# Patient Record
Sex: MALE | Race: WHITE | URBAN - METROPOLITAN AREA
[De-identification: names, ages, dates, MRNs, and addresses within clinical notes are randomized per-mention and may not be internally consistent; named-entity substitution may affect disease eponyms.]

---

## 2017-03-01 ENCOUNTER — MEDICAL CORRESPONDENCE (OUTPATIENT)
Dept: HEALTH INFORMATION MANAGEMENT | Facility: CLINIC | Age: 22
End: 2017-03-01

## 2017-03-01 ENCOUNTER — OFFICE VISIT (OUTPATIENT)
Dept: ONCOLOGY | Facility: CLINIC | Age: 22
End: 2017-03-01
Attending: GENETIC COUNSELOR, MS
Payer: COMMERCIAL

## 2017-03-01 DIAGNOSIS — Z80.0 FAMILY HISTORY OF PANCREATIC CANCER: ICD-10-CM

## 2017-03-01 DIAGNOSIS — Z84.81 FAMILY HISTORY OF CARRIER OF GENETIC DISEASE: ICD-10-CM

## 2017-03-01 DIAGNOSIS — Z84.81 FAMILY HISTORY OF CARRIER OF GENETIC DISEASE: Primary | ICD-10-CM

## 2017-03-01 DIAGNOSIS — Z80.0 FAMILY HISTORY OF COLON CANCER: ICD-10-CM

## 2017-03-01 DIAGNOSIS — Z80.49 FAMILY HISTORY OF UTERINE CANCER: ICD-10-CM

## 2017-03-01 LAB — MISCELLANEOUS TEST: NORMAL

## 2017-03-01 PROCEDURE — 96040 ZZH GENETIC COUNSELING, EACH 30 MINUTES: CPT | Mod: ZF | Performed by: GENETIC COUNSELOR, MS

## 2017-03-01 NOTE — LETTER
3/1/2017       RE: Henrik Alexander  [address] [facility] [address]     Dear Colleague,    Thank you for referring your patient, Henrik Alexander, to the [facility]. Please see a copy of my visit note below.    3/1/2017    Referring Provider: self-referred    Presenting Information:   I met with Henrik Alexander and his mother today for genetic counseling at the Cancer Risk Management Program at the [facility] to discuss his family history of Ford syndrome.  He is here today to review this history, cancer screening recommendations, and available genetic testing options.    Personal History:  Henrik is a 21 year old male.  He does not have a personal history of cancer, and is reportedly healthy. He has not had a colonoscopy.     Family History: (Please see scanned pedigree for detailed family history information)    Henrik's mother was diagnosed with Ford syndrome, folloiwng her endometrial cancer diagnosis in 2004. She previously had colon cancer at age 25.  She had genetic testing at [facility], which she reports was sent to a laboratory in [place] (she says [facility] does sound like the correct lab).  She does not recall which gene she tested positive for, but when reviewing the genes, she thinks that either MLH1 or MSH2 (one of the Ford syndrome genes associated with a higher risk for cancer) sounded correct; she did not recall ever hearing about EPCAM.     Henrik's siblings have all been tested for Ford syndrome:    His brother, age 26, is positive, and has had previous colonoscopies, which have not shown concerning findings per report. He had an upper endoscopy today, and a biopsy was taken.      Sister, age 32, is negative.     Sister, age 21, is negative.    Sister, age 21, (twin to above sister) is positive.  According to Henrik's mother, she has had three colonoscopies total, and has had one pre-cancerous polyp removed.  She had her most recent colonoscopy in  2017 that was normal.    Henrik's maternal aunt passed away from pancreatic cancer at 59. She was tested for Ford syndrome before she passed, and was negative.  She was a past smoker, but did not drink and did not have diabetes.     Maternal aunt has Ford syndrome, and was diagnosed with colon cancer at 51 with bladder metastasis. She is currently 53.  She has had her uterus and ovaries removed.  She has three children that have not yet been tested for Ford syndrome.    Henrik has two maternal uncles, 49 and 46, who have not had genetic testing.  They have not had cancer, nor have their children, who have also not been tested. Henrik's mother reports that she has been trying to get them to come in for testing.    Maternal grandmother  at 76 from kidney failure.    Maternal grandfather  at 72 from pancreatic cancer, and also had a histoyr of colon cancer his his 40s.  He had four sisters, one of whom Henrik's mother thinks had lymphoma.  She does not believe his other three sisters or his parents had cancer.    No paternal family history of cancer, aside from lung cancer in his paternal grandmother, who was a smoker.    His maternal ethnicity is [place]. His paternal ethnicity is Turkish.  There is no known Ashkenazi Pentecostal ancestry on either side of his family. There is no reported consanguinity.    Discussion:    We discussed the natural history and genetics of Ford syndrome today.  Ford syndrome can be caused by a mutation in one of five genes:  MLH1, MSH2, MSH6, PMS2, and EPCAM. We focused on the high-risk Ford syndrome genes.  We discussed that the highest cancer risks associated with Ford syndrome include colon cancer, endometrial/uterine cancer, gastric cancer, and ovarian cancer.    A detailed handout regarding Ford syndrome and the information we discussed was provided to Henrik at the end of our appointment today and can be found in the after visit summary.  Topics included:  inheritance pattern, cancer risks, cancer screening recommendations, and also risks, benefits and limitations of testing.    Based on his personal and family history, Henrik meets current National Comprehensive Cancer Network (NCCN) criteria for genetic testing for Ford syndrome, as there is a known diagnosis in the family.      Genetic testing is available for single site analysis of the Ford syndrome mutation found in Henrik's family.  We discussed there is a 50% chance that he also has this mutation.    We also discussed that in rare situations in which both parents have a mutation in a Ford syndrome gene, their children each have a 25% risk for constitutional mismatch repair deficiency syndrome (CMMRD).  CMMRD is a disorder that causes cafe-au-lait spots and childhood cancers.  For individuals of childbearing age with Ford syndrome, genetic counseling and genetic testing may be advised for their partners.    The information from genetic testing may determine additional cancer screening recommendations for Henrik.  These recommendations will be discussed in detail once genetic testing is completed.    Plan:  1) Today Henrik elected to proceed with single site testing for the Ford syndrome mutation in his family.  His mother will send me his test report later.  2) This information should be available in 3-4 weeks, once testing is initiated.  3) Henrik will return to clinic to discuss the results    Face to face time: 30 minutes    Destinee Jensen MS, [facility]  Certified Genetic Counselor  P. [phone]  F. 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  Henirk's mom will still get me a physical copy of her test report.    Again, thank you for allowing me to participate in the care of your patient.      Sincerely,    Destinee Jensen GC

## 2017-03-01 NOTE — PROGRESS NOTES
3/1/2017    Referring Provider: self-referred    Presenting Information:   I met with Henrik Alexander and his mother today for genetic counseling at the Cancer Risk Management Program at the [facility] to discuss his family history of Ford syndrome.  He is here today to review this history, cancer screening recommendations, and available genetic testing options.    Personal History:  Henrik is a 21 year old male.  He does not have a personal history of cancer, and is reportedly healthy. He has not had a colonoscopy.     Family History: (Please see scanned pedigree for detailed family history information)    Henrik's mother was diagnosed with Ford syndrome, folloiwng her endometrial cancer diagnosis in 2004. She previously had colon cancer at age 25.  She had genetic testing at [facility], which she reports was sent to a laboratory in [place] (she says [facility] does sound like the correct lab).  She does not recall which gene she tested positive for, but when reviewing the genes, she thinks that either MLH1 or MSH2 (one of the Ford syndrome genes associated with a higher risk for cancer) sounded correct; she did not recall ever hearing about EPCAM.     Henrik's siblings have all been tested for Ford syndrome:    His brother, age 26, is positive, and has had previous colonoscopies, which have not shown concerning findings per report. He had an upper endoscopy today, and a biopsy was taken.      Sister, age 32, is negative.     Sister, age 21, is negative.    Sister, age 21, (twin to above sister) is positive.  According to Henrik's mother, she has had three colonoscopies total, and has had one pre-cancerous polyp removed.  She had her most recent colonoscopy in January 2017 that was normal.    Henrik's maternal aunt passed away from pancreatic cancer at 59. She was tested for Ford syndrome before she passed, and was negative.  She was a past smoker, but did not drink and did not have diabetes.      Maternal aunt has Ford syndrome, and was diagnosed with colon cancer at 51 with bladder metastasis. She is currently 53.  She has had her uterus and ovaries removed.  She has three children that have not yet been tested for Ford syndrome.    Henrik has two maternal uncles, 49 and 46, who have not had genetic testing.  They have not had cancer, nor have their children, who have also not been tested. Henrik's mother reports that she has been trying to get them to come in for testing.    Maternal grandmother  at 76 from kidney failure.    Maternal grandfather  at 72 from pancreatic cancer, and also had a histoyr of colon cancer his his 40s.  He had four sisters, one of whom Henrik's mother thinks had lymphoma.  She does not believe his other three sisters or his parents had cancer.    No paternal family history of cancer, aside from lung cancer in his paternal grandmother, who was a smoker.    His maternal ethnicity is [place]. His paternal ethnicity is [place].  There is no known Ashkenazi Rest[employer] ancestry on either side of his family. There is no reported consanguinity.    Discussion:    We discussed the natural history and genetics of Ford syndrome today.  Ford syndrome can be caused by a mutation in one of five genes:  MLH1, MSH2, MSH6, PMS2, and EPCAM. We focused on the high-risk Ford syndrome genes.  We discussed that the highest cancer risks associated with Ford syndrome include colon cancer, endometrial/uterine cancer, gastric cancer, and ovarian cancer.    A detailed handout regarding Ford syndrome and the information we discussed was provided to Henrik at the end of our appointment today and can be found in the after visit summary.  Topics included: inheritance pattern, cancer risks, cancer screening recommendations, and also risks, benefits and limitations of testing.    Based on his personal and family history, Henrik meets current National Comprehensive Cancer Network (NCCN) criteria for  genetic testing for Ford syndrome, as there is a known diagnosis in the family.      Genetic testing is available for single site analysis of the Ford syndrome mutation found in Henrik's family.  We discussed there is a 50% chance that he also has this mutation.    We also discussed that in rare situations in which both parents have a mutation in a Ford syndrome gene, their children each have a 25% risk for constitutional mismatch repair deficiency syndrome (CMMRD).  CMMRD is a disorder that causes cafe-au-lait spots and childhood cancers.  For individuals of childbearing age with Ford syndrome, genetic counseling and genetic testing may be advised for their partners.    The information from genetic testing may determine additional cancer screening recommendations for Henrik.  These recommendations will be discussed in detail once genetic testing is completed.    Plan:  1) Today Henrik elected to proceed with single site testing for the Ford syndrome mutation in his family.  His mother will send me his test report later.  2) This information should be available in 3-4 weeks, once testing is initiated.  3) Henrik will return to clinic to discuss the results    Face to face time: 30 minutes    Destinee Jensen MS, [facility]  Certified Genetic Counselor  P. [phone]  F. 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  Henrik's mom will still get me a physical copy of her test report.

## 2017-03-01 NOTE — PATIENT INSTRUCTIONS
Assessing Cancer Risk  Only about 5-10% of cancers are thought to be due to an inherited cancer susceptibility gene.    These families often have:    Several people with the same or related types of cancer    Cancers diagnosed at a young age (before age 50)    Individuals with more than one primary cancer    Multiple generations of the family affected with cancer    Ford Syndrome Genes  We each inherit two copies of every gene in our bodies: one from our mother and one from our father.  Each gene has a specific job to do.  When a gene has a mistake or  mutation  in it, it does not work like it should. Currently five genes are known to cause Ford Syndrome: MLH1, MSH2, MSH6, PMS2, and EPCAM.  A single mutation in one of the Ford Syndrome genes increases the risk for colon, endometrial, ovarian, and stomach cancers.  Other cancers that occur less commonly in Ford Syndrome include urinary tract, skin, and brain cancers.  The table below lists the chance that a person with Ford syndrome would develop cancer in his or her lifetime1.      Lifetime Cancer Risks    General Population Ford Syndrome   Colon 5.5% 10-80%   Endometrial 2.7% 15-60%   Stomach <1% 1-13%   Ovarian 1.6% 4-24%     Inheriting a mutation does not mean a person will develop cancer, but it does significantly increase his or her risk above the general population risk.    Inheritance   Ford Syndrome mutations are inherited in an autosomal dominant pattern.  This means that if a parent has a mutation, each of his or her children will have a 50% chance of inheriting that same mutation.  Therefore, each child--male or female--would have a 50% chance of being at increased risk for developing cancer.          Image obtained from Genetics Home Reference, 2013     Tumor Screening for Ford Syndrome  There are two different tests that can be done on a person s colon or endometrial tumor as an initial screen for Ford Syndrome. These tests are called IHC  (immunohistochemistry) and MSI (microsatellite instability). Tumors that show an absent protein on IHC or an unstable MSI result could be due to a mutation in one of the known Ford Syndrome genes. The IHC results can also guide further genetic testing for Ford Syndrome by indicating which gene should be tested.     Genetic Testing  Genetic testing involves a blood test and will look at the genetic information in the Ford Syndrome genes for any harmful mutations that are associated with increased cancer risk.  If possible, it is recommended that the person(s) who has had cancer be tested first before other family members.  That person will give us the most useful information about whether or not a specific gene is associated with the cancer in the family.    Results  There are three possible results of Ford Syndrome genetic testing:    Positive--a harmful mutation was identified     Negative--no mutation was identified     Variant of unknown significance--a variation in one of the genes was identified, but it is unclear how this impacts cancer risk in the family    Advantages and Disadvantages  There are advantages and disadvantages to genetic testing of these genes.    Advantages    May clarify your cancer risk    Can help you make medical decisions    May explain the cancers in your family    May give useful information to your family members (if you share your results)    Disadvantages    Possible negative emotional impact of learning about inherited cancer risk    Uncertainty in interpreting a negative test result in some situations    Possible genetic discrimination concerns (see below)    Genetic Information Nondiscrimination Act (MARCELLE)  MARCELLE is a federal law that protects individuals from health insurance or employment discrimination based on a genetic test result alone.  Although rare, there are currently no legal protections in terms of life insurance, long term care, or disability insurances.  Visit the  National Human Genome Research [place] genome.gov/56704268 to learn more.    Reducing Cancer Risk  Current screening recommendations for people with a Ford Syndrome mutation include1:    Colorectal: Colonoscopy beginning at age 20-25 or 2-5 years before earliest diagnosis of colorectal cancer in the family; repeated every 1-2 years depending on family history    Endometrial/Ovarian:   o Consider surgery to remove uterus, ovaries, and fallopian tubes after child bearing  o Talk to your doctor about possible endometrial biopsy, transvaginal ultrasound, and CA-125 blood test screening for endometrial and ovarian cancer. There are limitations to this type of screening.    Stomach: Consider upper endoscopy (EGD with extended duodenoscopy) beginning at age 30-35; repeated every 3-5 years    Urinary Tract: Consider annual urinalysis starting at 25-30    Brain: Annual physical examination beginning at age 25-30     Depending upon the mutation in the family, dermatology evaluation or prostate screening may be recommended.  Early detection and prevention are primary goals of screening for colorectal cancer.  These recommendations may change based on the specific gene mutation in the family.     Questions to Think About Regarding Genetic Testing    What effect will the test result have on me and my relationship with my family members if I have an inherited gene mutation?  If I don t have a gene mutation?    Should I share my test results, and how will my family react to this news, which may also affect them?    Are my children ready to learn new information that may one day affect their own health?    Resources  Ford Syndrome International [email or web address]-klasa.[email or web address]   Ford Syndrome Screening Network lynchscreening.net   American Cancer Society (ACS) cancer.org   National Cancer [place] (NCI) cancer.gov     Please call us if you have any questions or concerns.    Cancer Risk Management Program [phone]  ([phone])  ? Lynne Cody, MS, [facility]  [phone]  ? Destinee Jensen, MS, [facility]  [phone]  ? Betzy Gleason, MS, [facility]  [phone]  ? Mei Torres, MS, [facility]  [phone]    References  1. National Comprehensive Cancer Network. Clinical practice guidelines in oncology, colorectal cancer screening. Available online (registration required). 2013.

## 2017-03-01 NOTE — MR AVS SNAPSHOT
After Visit Summary   3/1/2017    Henrik Alexander    MRN: [id number]           Patient Information     Date Of Birth          1995        Visit Information        Provider Department      3/1/2017 2:15 PM Destinee Jensen GC;  2 [address] CONSULT [facility]        Today's Diagnoses     Family history of carrier of genetic disease    -  1    Family history of colon cancer        Family history of uterine cancer        Family history of pancreatic cancer          Care Instructions        Assessing Cancer Risk  Only about 5-10% of cancers are thought to be due to an inherited cancer susceptibility gene.    These families often have:    Several people with the same or related types of cancer    Cancers diagnosed at a young age (before age 50)    Individuals with more than one primary cancer    Multiple generations of the family affected with cancer    Ford Syndrome Genes  We each inherit two copies of every gene in our bodies: one from our mother and one from our father.  Each gene has a specific job to do.  When a gene has a mistake or  mutation  in it, it does not work like it should. Currently five genes are known to cause Ford Syndrome: MLH1, MSH2, MSH6, PMS2, and EPCAM.  A single mutation in one of the Ford Syndrome genes increases the risk for colon, endometrial, ovarian, and stomach cancers.  Other cancers that occur less commonly in Ford Syndrome include urinary tract, skin, and brain cancers.  The table below lists the chance that a person with Ford syndrome would develop cancer in his or her lifetime1.      Lifetime Cancer Risks    General Population Ford Syndrome   Colon 5.5% 10-80%   Endometrial 2.7% 15-60%   Stomach <1% 1-13%   Ovarian 1.6% 4-24%     Inheriting a mutation does not mean a person will develop cancer, but it does significantly increase his or her risk above the general population risk.    Inheritance   Ford Syndrome mutations are inherited in an autosomal  dominant pattern.  This means that if a parent has a mutation, each of his or her children will have a 50% chance of inheriting that same mutation.  Therefore, each child--male or female--would have a 50% chance of being at increased risk for developing cancer.          Image obtained from Genetics Home Reference, 2013     Tumor Screening for Ford Syndrome  There are two different tests that can be done on a person s colon or endometrial tumor as an initial screen for Ford Syndrome. These tests are called IHC (immunohistochemistry) and MSI (microsatellite instability). Tumors that show an absent protein on IHC or an unstable MSI result could be due to a mutation in one of the known Ford Syndrome genes. The IHC results can also guide further genetic testing for Ford Syndrome by indicating which gene should be tested.     Genetic Testing  Genetic testing involves a blood test and will look at the genetic information in the Ford Syndrome genes for any harmful mutations that are associated with increased cancer risk.  If possible, it is recommended that the person(s) who has had cancer be tested first before other family members.  That person will give us the most useful information about whether or not a specific gene is associated with the cancer in the family.    Results  There are three possible results of Ford Syndrome genetic testing:    Positive--a harmful mutation was identified     Negative--no mutation was identified     Variant of unknown significance--a variation in one of the genes was identified, but it is unclear how this impacts cancer risk in the family    Advantages and Disadvantages  There are advantages and disadvantages to genetic testing of these genes.    Advantages    May clarify your cancer risk    Can help you make medical decisions    May explain the cancers in your family    May give useful information to your family members (if you share your results)    Disadvantages    Possible  negative emotional impact of learning about inherited cancer risk    Uncertainty in interpreting a negative test result in some situations    Possible genetic discrimination concerns (see below)    Genetic Information Nondiscrimination Act (MARCELLE)  MARCELLE is a federal law that protects individuals from health insurance or employment discrimination based on a genetic test result alone.  Although rare, there are currently no legal protections in terms of life insurance, long term care, or disability insurances.  Visit the National Human Genome Research [place] genome.gov/83421747 to learn more.    Reducing Cancer Risk  Current screening recommendations for people with a Ford Syndrome mutation include1:    Colorectal: Colonoscopy beginning at age 20-25 or 2-5 years before earliest diagnosis of colorectal cancer in the family; repeated every 1-2 years depending on family history    Endometrial/Ovarian:   o Consider surgery to remove uterus, ovaries, and fallopian tubes after child bearing  o Talk to your doctor about possible endometrial biopsy, transvaginal ultrasound, and CA-125 blood test screening for endometrial and ovarian cancer. There are limitations to this type of screening.    Stomach: Consider upper endoscopy (EGD with extended duodenoscopy) beginning at age 30-35; repeated every 3-5 years    Urinary Tract: Consider annual urinalysis starting at 25-30    Brain: Annual physical examination beginning at age 25-30     Depending upon the mutation in the family, dermatology evaluation or prostate screening may be recommended.  Early detection and prevention are primary goals of screening for colorectal cancer.  These recommendations may change based on the specific gene mutation in the family.     Questions to Think About Regarding Genetic Testing    What effect will the test result have on me and my relationship with my family members if I have an inherited gene mutation?  If I don t have a gene  mutation?    Should I share my test results, and how will my family react to this news, which may also affect them?    Are my children ready to learn new information that may one day affect their own health?    Resources  Ford Syndrome International lynchcancers.E-Diversify Yourself   Ford Syndrome Screening Network lynchscreening.net   American Cancer Society (ACS) cancer.org   National Cancer [place] (NCI) cancer.gov     Please call us if you have any questions or concerns.    Cancer Risk Management Program [phone] ([phone])  ? Lynne Glo, MS, [facility]  [phone]  ? Destinee Camila, MS, [facility]  [phone]  ? Betzy Gleason, MS, [facility]  [phone]  ? Mei Torres, MS, [facility]  [phone]    References  1. National Comprehensive Cancer Network. Clinical practice guidelines in oncology, colorectal cancer screening. Available online (registration required). 2013.                Follow-ups after your visit        Your next 10 appointments already scheduled     Apr 05, 2017  1:15 PM CDT   (Arrive by 1:00 PM)   RETURN WITH ROOM with Destinee Jensen GC,  2 114 CONSULT [facility] ([facility])    [address] [place] [id number] [phone]              Who to contact     If you have questions or need follow up information about today's clinic visit or your schedule please contact [facility] directly at [phone].  Normal or non-critical lab and imaging results will be communicated to you by MyChart, letter or phone within 4 business days after the clinic has received the results. If you do not hear from us within 7 days, please contact the clinic through MyChart or phone. If you have a critical or abnormal lab result, we will notify you by phone as soon as possible.  Submit refill requests through [employer]book or call your pharmacy and they will forward the refill request to us. Please allow 3 business days for your refill to be  "completed.          Additional Information About Your Visit        [employer]harSport[employer]zer Information     PlayerLync lets you send messages to your doctor, view your test results, renew your prescriptions, schedule appointments and more. To sign up, go to [email or web address] . Click on \"Log in\" on the left side of the screen, which will take you to the Welcome page. Then click on \"Sign up Now\" on the right side of the page.     You will be asked to enter the access code listed below, as well as some personal information. Please follow the directions to create your username and password.     Your access code is: [email or web address]  Expires: 2017  2:19 PM     Your access code will  in 90 days. If you need help or a new code, please call your [place] clinic or [phone].        Care EveryWhere ID     This is your Care EveryWhere ID. This could be used by other organizations to access your [place] medical records  [id number]         Blood Pressure from Last 3 Encounters:   No data found for BP    Weight from Last 3 Encounters:   No data found for Wt               Primary Care Provider    None Specified       No primary provider on file.        Thank you!     Thank you for choosing [facility]  for your care. Our goal is always to provide you with excellent care. Hearing back from our patients is one way we can continue to improve our services. Please take a few minutes to complete the written survey that you may receive in the mail after your visit with us. Thank you!             Your Updated Medication List - Protect others around you: Learn how to safely use, store and throw away your medicines at www.disposemymeds.org.      Notice  As of 3/1/2017 11:59 PM    You have not been prescribed any medications.      "

## 2017-03-01 NOTE — LETTER
Cancer Risk Management  Program Locations    [facility]  [facility]  Mailing Address  Cancer Risk Management Program  [facility]  [address]  [place] [address]    New patient appointments  [phone]  March 2, 2017    Henrik Alexander  [address] [place] [facility] [address]    Dear Henrik,    It was a pleasure meeting with you and your mother at the [facility] on 3/1/17.  Here is a copy of the progress note from your recent genetic counseling visit to the Cancer Risk Management Program.  If you have any additional questions, please feel free to call.    Referring Provider: self-referred    Presenting Information:   I met with Henrik Alexander and his mother today for genetic counseling at the Cancer Risk Management Program at the [facility] to discuss his family history of Ford syndrome.  He is here today to review this history, cancer screening recommendations, and available genetic testing options.    Personal History:  Henrik is a 21 year old male.  He does not have a personal history of cancer, and is reportedly healthy. He has not had a colonoscopy.     Family History: (Please see scanned pedigree for detailed family history information)    Henrik's mother was diagnosed with Ford syndrome, folloiwng her endometrial cancer diagnosis in 2004. She previously had colon cancer at age 25.  She had genetic testing at [facility], which she reports was sent to a laboratory in [place] (she says [facility] does sound like the correct lab).  She does not recall which gene she tested positive for, but when reviewing the genes, she thinks that either MLH1 or MSH2 (one of the Ford syndrome genes associated with a higher risk for cancer) sounded correct; she did not recall ever hearing about  EPCAM.     Henrik's siblings have all been tested for Ford syndrome:    His brother, age 26, is positive, and has had previous colonoscopies, which have not shown concerning findings per report. He had an upper endoscopy today, and a biopsy was taken.      Sister, age 32, is negative.     Sister, age 21, is negative.    Sister, age 21, (twin to above sister) is positive.  According to Henrik's mother, she has had three colonoscopies total, and has had one pre-cancerous polyp removed.  She had her most recent colonoscopy in 2017 that was normal.    Henrik's maternal aunt passed away from pancreatic cancer at 59. She was tested for Ford syndrome before she passed, and was negative.  She was a past smoker, but did not drink and did not have diabetes.     Maternal aunt has Ford syndrome, and was diagnosed with colon cancer at 51 with bladder metastasis. She is currently 53.  She has had her uterus and ovaries removed.  She has three children that have not yet been tested for Ford syndrome.    Henrik has two maternal uncles, 49 and 46, who have not had genetic testing.  They have not had cancer, nor have their children, who have also not been tested. Henrik's mother reports that she has been trying to get them to come in for testing.    Maternal grandmother  at 76 from kidney failure.    Maternal grandfather  at 72 from pancreatic cancer, and also had a histoyr of colon cancer his his 40s.  He had four sisters, one of whom Henrik's mother thinks had lymphoma.  She does not believe his other three sisters or his parents had cancer.    No paternal family history of cancer, aside from lung cancer in his paternal grandmother, who was a smoker.    His maternal ethnicity is [place]. His paternal ethnicity is [place].  There is no known Ashkenazi Mormon ancestry on either side of his family. There is no reported consanguinity.    Discussion:    We discussed the natural history and genetics of Ford syndrome  today.  Ford syndrome can be caused by a mutation in one of five genes:  MLH1, MSH2, MSH6, PMS2, and EPCAM. We focused on the high-risk Ford syndrome genes.  We discussed that the highest cancer risks associated with Ford syndrome include colon cancer, endometrial/uterine cancer, gastric cancer, and ovarian cancer.    A detailed handout regarding Ford syndrome and the information we discussed was provided to Henrik at the end of our appointment today and can be found in the after visit summary.  Topics included: inheritance pattern, cancer risks, cancer screening recommendations, and also risks, benefits and limitations of testing.    Based on his personal and family history, Henrik meets current National Comprehensive Cancer Network (NCCN) criteria for genetic testing for Ford syndrome, as there is a known diagnosis in the family.      Genetic testing is available for single site analysis of the Ford syndrome mutation found in Henrik's family.  We discussed there is a 50% chance that he also has this mutation.    We also discussed that in rare situations in which both parents have a mutation in a Ford syndrome gene, their children each have a 25% risk for constitutional mismatch repair deficiency syndrome (CMMRD).  CMMRD is a disorder that causes cafe-au-lait spots and childhood cancers.  For individuals of childbearing age with Ford syndrome, genetic counseling and genetic testing may be advised for their partners.    The information from genetic testing may determine additional cancer screening recommendations for Henrik.  These recommendations will be discussed in detail once genetic testing is completed.    Plan:  1) Today Henrik elected to proceed with single site testing for the Ford syndrome mutation in his family.  His mother will send me his test report later.  2) This information should be available in 3-4 weeks, once testing is initiated.  3) Henrik will return to clinic to discuss the results    Face  to face time: 30 minutes    Destinee Jensen MS, [facility]  Certified Genetic Counselor  P. [phone]  F. [phone]

## 2017-03-01 NOTE — LETTER
Date:March 3, 2017      Patient was self referred, no letter generated. Do not send.        [facility]

## 2017-03-01 NOTE — NURSING NOTE
Chief Complaint   Patient presents with     Blood Draw     Pt here for labs, labs collected via venipuncture.

## 2017-04-05 ENCOUNTER — OFFICE VISIT (OUTPATIENT)
Dept: ONCOLOGY | Facility: CLINIC | Age: 22
End: 2017-04-05
Attending: GENETIC COUNSELOR, MS
Payer: COMMERCIAL

## 2017-04-05 DIAGNOSIS — Z15.09 MSH2-RELATED LYNCH SYNDROME (HNPCC1): ICD-10-CM

## 2017-04-05 PROCEDURE — 40000114 ZZH STATISTIC NO CHARGE CLINIC VISIT

## 2017-04-05 PROCEDURE — 40000072 ZZH STATISTIC GENETIC COUNSELING, < 16 MIN: Mod: ZF | Performed by: GENETIC COUNSELOR, MS

## 2017-04-05 NOTE — PATIENT INSTRUCTIONS
Assessing Cancer Risk  Only about 5-10% of cancers are thought to be due to an inherited cancer susceptibility gene.    These families often have:    Several people with the same or related types of cancer    Cancers diagnosed at a young age (before age 50)    Individuals with more than one primary cancer    Multiple generations of the family affected with cancer    Ford Syndrome Genes  We each inherit two copies of every gene in our bodies: one from our mother and one from our father.  Each gene has a specific job to do.  When a gene has a mistake or  mutation  in it, it does not work like it should. Currently five genes are known to cause Ford Syndrome: MLH1, MSH2, MSH6, PMS2, and EPCAM.  A single mutation in one of the Ford Syndrome genes increases the risk for colon, endometrial, ovarian, and stomach cancers.  Other cancers that occur less commonly in Ford Syndrome include urinary tract, skin, and brain cancers.  The table below lists the chance that a person with Ford syndrome would develop cancer in his or her lifetime1.      Lifetime Cancer Risks    General Population Ford Syndrome   Colon 5.5% 10-80%   Endometrial 2.7% 15-60%   Stomach <1% 1-13%   Ovarian 1.6% 4-24%     Inheriting a mutation does not mean a person will develop cancer, but it does significantly increase his or her risk above the general population risk.    Inheritance   Ford Syndrome mutations are inherited in an autosomal dominant pattern.  This means that if a parent has a mutation, each of his or her children will have a 50% chance of inheriting that same mutation.  Therefore, each child--male or female--would have a 50% chance of being at increased risk for developing cancer.          Image obtained from Genetics Home Reference, 2013     Tumor Screening for Ford Syndrome  There are two different tests that can be done on a person s colon or endometrial tumor as an initial screen for Ford Syndrome. These tests are called IHC  (immunohistochemistry) and MSI (microsatellite instability). Tumors that show an absent protein on IHC or an unstable MSI result could be due to a mutation in one of the known Ford Syndrome genes. The IHC results can also guide further genetic testing for Ford Syndrome by indicating which gene should be tested.     Genetic Testing  Genetic testing involves a blood test and will look at the genetic information in the Ford Syndrome genes for any harmful mutations that are associated with increased cancer risk.  If possible, it is recommended that the person(s) who has had cancer be tested first before other family members.  That person will give us the most useful information about whether or not a specific gene is associated with the cancer in the family.    Results  There are three possible results of Ford Syndrome genetic testing:    Positive--a harmful mutation was identified     Negative--no mutation was identified     Variant of unknown significance--a variation in one of the genes was identified, but it is unclear how this impacts cancer risk in the family    Advantages and Disadvantages  There are advantages and disadvantages to genetic testing of these genes.    Advantages    May clarify your cancer risk    Can help you make medical decisions    May explain the cancers in your family    May give useful information to your family members (if you share your results)    Disadvantages    Possible negative emotional impact of learning about inherited cancer risk    Uncertainty in interpreting a negative test result in some situations    Possible genetic discrimination concerns (see below)    Genetic Information Nondiscrimination Act (MARCELLE)  MARCELLE is a federal law that protects individuals from health insurance or employment discrimination based on a genetic test result alone.  Although rare, there are currently no legal protections in terms of life insurance, long term care, or disability insurances.  Visit the  National Human Genome Research [place] genome.gov/40990396 to learn more.    Reducing Cancer Risk  Current screening recommendations for people with a Ford Syndrome mutation include1:    Colorectal: Colonoscopy beginning at age 20-25 or 2-5 years before earliest diagnosis of colorectal cancer in the family; repeated every 1-2 years depending on family history    Endometrial/Ovarian:   o Consider surgery to remove uterus, ovaries, and fallopian tubes after child bearing  o Talk to your doctor about possible endometrial biopsy, transvaginal ultrasound, and CA-125 blood test screening for endometrial and ovarian cancer. There are limitations to this type of screening.    Stomach: Consider upper endoscopy (EGD with extended duodenoscopy) beginning at age 30-35; repeated every 3-5 years    Urinary Tract: Consider annual urinalysis starting at 25-30    Brain: Annual physical examination beginning at age 25-30     Depending upon the mutation in the family, dermatology evaluation or prostate screening may be recommended.  Early detection and prevention are primary goals of screening for colorectal cancer.  These recommendations may change based on the specific gene mutation in the family.     Questions to Think About Regarding Genetic Testing    What effect will the test result have on me and my relationship with my family members if I have an inherited gene mutation?  If I don t have a gene mutation?    Should I share my test results, and how will my family react to this news, which may also affect them?    Are my children ready to learn new information that may one day affect their own health?    Resources  Ford Syndrome International lynchcancers.Roombeats   Ford Syndrome Screening Network lynchscreening.net   American Cancer Society (ACS) cancer.org   National Cancer [place] (NCI) cancer.gov     Please call us if you have any questions or concerns.    Cancer Risk Management Program [phone]  ([phone])  ? Lynne Cody, MS, [facility]  [phone]  ? Destinee Jensen, MS, [facility]  [phone]  ? Betzy Gleason, MS, [facility]  [phone]  ? Mei Torres, MS, [facility]  [phone]    References  1. National Comprehensive Cancer Network. Clinical practice guidelines in oncology, colorectal cancer screening. Available online (registration required). 2013.        Resources for Ford Syndrome     Ford Syndrome International -  http://lynchAkamedia.Compete/   Ford Syndrome International (LSI) provides support for individuals with Ford syndrome. This organization was founded by patients and health care providers who specialize in Ford syndrome. LSI strives to raise awareness, as well as educate others about Ford syndrome.     Hereditary Colon Cancer Takes Guts - http://www.[facility] Cent[facility]akesguts.org/peer-support  This is a non-profit organization that supports and connects individuals with hereditary colon cancer syndromes.  They also promote research and health care initiatives.     I Have Ford Syndrome - http://www.ihavelynchsyndrome.org/   The goal of this non-profit organization is to educate others and raise awareness about Ford syndrome in the medical community, as well as the public.      Ford Syndrome Screening Network - http://www.lynchscreening.net/   This organization was founded in 2011, with the goal to promote universal tumor screening for Ford syndrome for those with a diagnosis of colorectal and endometrial cancers.     [employer] - www.Studentgemssclubtwincities.org   [employer] is a 501(c)3 nonprofit and the local affiliate of the Cancer Support Community, a network of more than 54 supportive, free and welcoming  clubhouses  where everyone living with cancer can come for social, emotional and psychological support. Clubs are healing environments where individuals learn from each other with guidance from licensed professionals.    MOCA: [place] [facility] - http://mnovarian.org/   MOCA  was started in 1999 by a small group of ovarian cancer survivors who came together to fund ovarian cancer research, raise awareness of the disease, and provide support to women with ovarian cancer and their families.    Other References:    Genetics Home Reference - http://ghr.nlm.nih.gov/condition/mckeon-syndrome    American Cancer Society - www.cancer.org    How Do I Tell My Children? This article is about the BRCA gene for hereditary breast cancer, but the theme of the article applies to Mckeon syndrome, too.  http://www.facingourrisk.org/understanding-brca-and-hboc/publications/newsletter/archives/2008winter/how-tell-children.php     National Society of Genetic Counselors: http://www.nsgc.org/

## 2017-04-05 NOTE — LETTER
"    Cancer Risk Management  Program Locations    [facility]  [facility]  Mailing Address  Cancer Risk Management Program  [facility]  [address]  [place] [address]    New patient appointments  [phone]  April 5, 2017    Henrik Alexander  [address] [facility] [address]      Dear Henrik,    It was a pleasure meeting with you again.  Here is a copy of the progress note from your recent genetic counseling visit to the Cancer Risk Management Program on 4/5/17.  If you have any additional questions, please feel free to call.      Referring Provider: self-referred    Presenting Information:   Henrik returned to the Cancer Risk Management Program at the [facility] with his mother today to discuss his genetic testing results. His blood was drawn on 3/1/2017 and we ordered MSH2 site specific analysis testing from [employer]. This testing was done because of his family history of Ford syndrome.     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 This test only looked for one mutation.  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 We reviewed the impact of this testing on Henrik today, though he and his mother are already very familiar with Ford syndrome, given the family history.     A copy of the test report can be found in the Laboratory tab, dated 3/1/2017, and named \"SEND OUTS MISC TEST\". The report is scanned in as a linked document.          Cancer Risks:  Individuals with MSH2 mutations:    Have a 52-82% lifetime risk of developing colon cancer.     Lifetime " endometrial (uterine) cancer risk of 25-60%.    Lifetime ovarian cancer risk of approximately 4-12%.    Additional risks are seen for stomach (6-13%), small bowel (3-6%), urinary tract (1-7%), pancreatic (1-6%), brain (1-3%), and hepatobiliary tract cancers (1.4-4%).     Some families also have increased risk for sebaceous neoplasms (1-9%).      Cancer Screening and Prevention:  The following screening (NCCN guidelines) is recommended for individuals who have Ford syndrome due to a mutation in the MSH2 gene:    Colonoscopies starting at age 20-25 (or earlier based on family history), repeated every 1-2 years.    Possible consideration of upper endoscopy (EGD) with extended duodenoscopy starting at age 30-35, repeated every 3-5 years. This recommendation is largely dependent on family history.    Consider annual urinalysis starting at age 30-35.     Annual physical/neurological exams starting at 25-30.      When finished planning their families, women are encouraged to consider hysterectomy and removal of the ovaries due to the limitations in screening for these types of cancer.  However, transvaginal ultrasound, endometrial biopsy, and CA-125 blood tests could be considered to screen for these cancers.    There are no clear current screening recommendations for other Ford syndrome-related cancers such as breast cancer, pancreatic cancer, hepatobiliary tract cancer, etc. Screening for these and other cancers may be recommended based on family history.    Pancreatic cancer:  Due to Henrik s family history of pancreatic cancer (in two second-degree relatives, though one individual with pancreatic cancer did not have Ford syndrome by genetic testing), screening for pancreatic cancer may be considered (Joelle et al, Gut 2013; 62: 339-347)(Russel S, et al., Am J Gastroenterol 2015; 110:223-262).  The specific type of screening and recommendations on when screening should begin should be discussed with a physician.      Although no specific screening guidelines exist for sebaceous neoplasms, it may be reasonable for Henrik to see a dermatologist. Henrik's mother reports that her brother (who has not been tested for Ford syndrome, but is at 50% risk) does have lesions removed from his face quite often, but she is not sure what these are. She plans to ask him about this.     We discussed that Henrik could participate in our Cancer Risk Management Program in which our clinical nurse specialist provides an individual screening plan and assists with medical management.  He was agreeable to this, and a referral was made to see MARIA A Niño, for this service.  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     Implications for Family Members:  We reviewed that mutations in MSH2 are inherited in an autosomal dominant pattern. This means that each of Henrik's future children have a 50% chance of inheriting the same mutation.  Henrik's siblings have already been tested for Ford syndrome.  I am happy to help his other relatives connect with a genetic counselor in their area if they would like to discuss testing.    In rare situations in which both parents have a mutation in the MSH2 gene, their children each have a 25% risk for constitutional mismatch repair deficiency syndrome (CMMRD).  CMMRD is a disorder that causes cafe-au-lait spots and childhood cancers.  For individuals of childbearing age with MSH2 mutations, genetic counseling and genetic testing may be advised for their partners.    Support Resources:  I provided Henrik with support resources and a handout from our Cancer Risk Management Program on Ford syndrome (see after-visit summary)    Plan:  1.  I provided Henrik with a copy of his test results and support resources today.  2.  He plans to follow up with his physicians.  3. A referral to MARIA A Niño, was placed.  If  Henrik has additional questions, I encouraged him to contact  me  directly at [phone].   Time spent face to face: 15 minutes.  Destinee Jensen MS, [facility]  Certified Genetic Counselor  P. [phone]  F. [phone]

## 2017-04-05 NOTE — LETTER
Date:April 6, 2017      Patient was self referred, no letter generated. Do not send.        [facility] Health Information

## 2017-04-05 NOTE — MR AVS SNAPSHOT
After Visit Summary   4/5/2017    Henrik Alexander    MRN: [id number]           Patient Information     Date Of Birth          1995        Visit Information        Provider Department      4/5/2017 1:15 PM Destinee Jensen GC;  2 114 CONSULT [facility]        Care Instructions        Assessing Cancer Risk  Only about 5-10% of cancers are thought to be due to an inherited cancer susceptibility gene.    These families often have:    Several people with the same or related types of cancer    Cancers diagnosed at a young age (before age 50)    Individuals with more than one primary cancer    Multiple generations of the family affected with cancer    Ford Syndrome Genes  We each inherit two copies of every gene in our bodies: one from our mother and one from our father.  Each gene has a specific job to do.  When a gene has a mistake or  mutation  in it, it does not work like it should. Currently five genes are known to cause Ford Syndrome: MLH1, MSH2, MSH6, PMS2, and EPCAM.  A single mutation in one of the Ford Syndrome genes increases the risk for colon, endometrial, ovarian, and stomach cancers.  Other cancers that occur less commonly in Ford Syndrome include urinary tract, skin, and brain cancers.  The table below lists the chance that a person with Ford syndrome would develop cancer in his or her lifetime1.      Lifetime Cancer Risks    General Population Ford Syndrome   Colon 5.5% 10-80%   Endometrial 2.7% 15-60%   Stomach <1% 1-13%   Ovarian 1.6% 4-24%     Inheriting a mutation does not mean a person will develop cancer, but it does significantly increase his or her risk above the general population risk.    Inheritance   Ford Syndrome mutations are inherited in an autosomal dominant pattern.  This means that if a parent has a mutation, each of his or her children will have a 50% chance of inheriting that same mutation.  Therefore, each child--male or female--would have a  50% chance of being at increased risk for developing cancer.          Image obtained from Genetics Home Reference, 2013     Tumor Screening for Ford Syndrome  There are two different tests that can be done on a person s colon or endometrial tumor as an initial screen for Ford Syndrome. These tests are called IHC (immunohistochemistry) and MSI (microsatellite instability). Tumors that show an absent protein on IHC or an unstable MSI result could be due to a mutation in one of the known Ford Syndrome genes. The IHC results can also guide further genetic testing for Ford Syndrome by indicating which gene should be tested.     Genetic Testing  Genetic testing involves a blood test and will look at the genetic information in the Ford Syndrome genes for any harmful mutations that are associated with increased cancer risk.  If possible, it is recommended that the person(s) who has had cancer be tested first before other family members.  That person will give us the most useful information about whether or not a specific gene is associated with the cancer in the family.    Results  There are three possible results of Ford Syndrome genetic testing:    Positive--a harmful mutation was identified     Negative--no mutation was identified     Variant of unknown significance--a variation in one of the genes was identified, but it is unclear how this impacts cancer risk in the family    Advantages and Disadvantages  There are advantages and disadvantages to genetic testing of these genes.    Advantages    May clarify your cancer risk    Can help you make medical decisions    May explain the cancers in your family    May give useful information to your family members (if you share your results)    Disadvantages    Possible negative emotional impact of learning about inherited cancer risk    Uncertainty in interpreting a negative test result in some situations    Possible genetic discrimination concerns (see below)    Genetic  Information Nondiscrimination Act (MARCELLE)  MARCELLE is a federal law that protects individuals from health insurance or employment discrimination based on a genetic test result alone.  Although rare, there are currently no legal protections in terms of life insurance, long term care, or disability insurances.  Visit the National Human Genome Research [place] genome.gov/76777988 to learn more.    Reducing Cancer Risk  Current screening recommendations for people with a Ford Syndrome mutation include1:    Colorectal: Colonoscopy beginning at age 20-25 or 2-5 years before earliest diagnosis of colorectal cancer in the family; repeated every 1-2 years depending on family history    Endometrial/Ovarian:   o Consider surgery to remove uterus, ovaries, and fallopian tubes after child bearing  o Talk to your doctor about possible endometrial biopsy, transvaginal ultrasound, and CA-125 blood test screening for endometrial and ovarian cancer. There are limitations to this type of screening.    Stomach: Consider upper endoscopy (EGD with extended duodenoscopy) beginning at age 30-35; repeated every 3-5 years    Urinary Tract: Consider annual urinalysis starting at 25-30    Brain: Annual physical examination beginning at age 25-30     Depending upon the mutation in the family, dermatology evaluation or prostate screening may be recommended.  Early detection and prevention are primary goals of screening for colorectal cancer.  These recommendations may change based on the specific gene mutation in the family.     Questions to Think About Regarding Genetic Testing    What effect will the test result have on me and my relationship with my family members if I have an inherited gene mutation?  If I don t have a gene mutation?    Should I share my test results, and how will my family react to this news, which may also affect them?    Are my children ready to learn new information that may one day affect their own  health?    Resources  Ford Syndrome International lynchcancers.com   Ford Syndrome Screening Network lynchscreening.net   American Cancer Society (ACS) cancer.org   National Cancer [place] (NCI) cancer.gov     Please call us if you have any questions or concerns.    Cancer Risk Management Program [phone] ([phone])  ? Lynne Cody, MS, [facility]  [phone]  ? Destinee Camila, MS, [facility]  [phone]  ? Betzy Victorino, MS, [facility]  [phone]  ? Mei Torres, MS, [facility]  [phone]    References  1. National Comprehensive Cancer Network. Clinical practice guidelines in oncology, colorectal cancer screening. Available online (registration required). 2013.        Resources for Ford Syndrome     Ford Syndrome International -  http://Functional Neuromodulation/   Ford Syndrome International (LSI) provides support for individuals with Ford syndrome. This organization was founded by patients and health care providers who specialize in Ford syndrome. LSI strives to raise awareness, as well as educate others about Ford syndrome.     Hereditary Colon Cancer Takes Guts - http://www.hcctakesguts.org/peer-support  This is a non-profit organization that supports and connects individuals with hereditary colon cancer syndromes.  They also promote research and health care initiatives.     I Have Ford Syndrome - http://www.ihavelynchsyndrome.org/   The goal of this non-profit organization is to educate others and raise awareness about Ford syndrome in the medical community, as well as the public.      Ford Syndrome Screening Network - http://www.lynchscreening.net/   This organization was founded in 2011, with the goal to promote universal tumor screening for Ford syndrome for those with a diagnosis of colorectal and endometrial cancers.     [place] [facility] - www.Conductorsclubtwincities.org   "[employer]" [facility] is a 501(c)3 nonprofit and the local affiliate of the Cancer Support Community, a network of more than 54  supportive, free and welcoming  clubhouses  where everyone living with cancer can come for social, emotional and psychological support. Clubs are healing environments where individuals learn from each other with guidance from licensed professionals.    MOCA: [place] [facility] - http://mnovarian.org/   MOCA was started in 1999 by a small group of ovarian cancer survivors who came together to fund ovarian cancer research, raise awareness of the disease, and provide support to women with ovarian cancer and their families.    Other References:    Genetics Home Reference - http://ghr.nlm.nih.gov/condition/mckeon-syndrome    American Cancer Society - www.cancer.org    How Do I Tell My Children? This article is about the BRCA gene for hereditary breast cancer, but the theme of the article applies to Mckeon syndrome, too.  http://www.facingourrisk.org/understanding-brca-and-hboc/publications/newsletter/archives/2008winter/how-tell-children.php     National Society of Genetic Counselors: http://www.nsgc.org/           Follow-ups after your visit        Your next 10 appointments already scheduled     Apr 05, 2017  1:15 PM CDT   (Arrive by 1:00 PM)   RETURN WITH ROOM with Destinee Jensen GC,  [address] CONSULT [facility] ([facility])    [address] [place] [id number] [phone]              Who to contact     If you have questions or need follow up information about today's clinic visit or your schedule please contact [facility] directly at [phone].  Normal or non-critical lab and imaging results will be communicated to you by MyChart, letter or phone within 4 business days after the clinic has received the results. If you do not hear from us within 7 days, please contact the clinic through MyChart or phone. If you have a critical or abnormal lab result, we will notify you by phone as soon as possible.  Submit  "refill requests through [employer] or call your pharmacy and they will forward the refill request to us. Please allow 3 business days for your refill to be completed.          Additional Information About Your Visit        [employer] Information     Akoha lets you send messages to your doctor, view your test results, renew your prescriptions, schedule appointments and more. To sign up, go to [email or web address] . Click on \"Log in\" on the left side of the screen, which will take you to the Welcome page. Then click on \"Sign up Now\" on the right side of the page.     You will be asked to enter the access code listed below, as well as some personal information. Please follow the directions to create your username and password.     Your access code is: [email or web address]  Expires: 2017  3:19 PM     Your access code will  in 90 days. If you need help or a new code, please call your [facility] clinic or [phone].        Care EveryWhere ID     This is your Care EveryWhere ID. This could be used by other organizations to access your [facility] medical records  [id number]         Blood Pressure from Last 3 Encounters:   No data found for BP    Weight from Last 3 Encounters:   No data found for Wt              Today, you had the following     No orders found for display       Primary Care Provider    None Specified       No primary provider on file.        Thank you!     Thank you for choosing [facility]  for your care. Our goal is always to provide you with excellent care. Hearing back from our patients is one way we can continue to improve our services. Please take a few minutes to complete the written survey that you may receive in the mail after your visit with us. Thank you!             Your Updated Medication List - Protect others around you: Learn how to safely use, store and throw away your medicines at www.disposemymeds.org.      Notice  As of 2017  1:13 PM    You have not been prescribed " any medications.

## 2017-04-05 NOTE — LETTER
"4/5/2017       RE: Henrik Alexander  [address] [facility] [address]     Dear Colleague,    Thank you for referring your patient, Henrik Alexander, to the [facility]. Please see a copy of my visit note below.    4/5/2017    Referring Provider: self-referred    Presenting Information:   Henrik returned to the Cancer Risk Management Program at the [facility] with his mother today to discuss his genetic testing results. His blood was drawn on 3/1/2017 and we ordered MSH2 site specific analysis testing from [facility]. This testing was done because of his family history of Ford syndrome.     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 This mutation was historically called R711X (2131C>T). This test only looked for one mutation.  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 We reviewed the impact of this testing on Henrik today, though he and his mother are already very familiar with Ford syndrome, given the family history.     A copy of the test report can be found in the Laboratory tab, dated 3/1/2017, and named \"SEND OUTS MISC TEST\". The report is scanned in as a linked document.    Cancer Risks:  Individuals with MSH2 mutations:    Have a 52-82% lifetime risk of developing colon cancer.     Lifetime endometrial (uterine) cancer risk of 25-60%.    Lifetime ovarian cancer risk of approximately 4-12%.    Additional risks are seen for stomach (6-13%), small bowel (3-6%), urinary tract (1-7%), pancreatic (1-6%), brain (1-3%), and hepatobiliary tract cancers (1.4-4%).     Some families also have increased risk for sebaceous neoplasms (1-9%).      Cancer Screening and Prevention:  The following screening (NCCN guidelines) is recommended for individuals who have Ford syndrome due to a mutation in the " MSH2 gene:    Colonoscopies starting at age 20-25 (or earlier based on family history), repeated every 1-2 years.    Possible consideration of upper endoscopy (EGD) with extended duodenoscopy starting at age 30-35, repeated every 3-5 years. This recommendation is largely dependent on family history.    Consider annual urinalysis starting at age 30-35.     Annual physical/neurological exams starting at 25-30.      When finished planning their families, women are encouraged to consider hysterectomy and removal of the ovaries due to the limitations in screening for these types of cancer.  However, transvaginal ultrasound, endometrial biopsy, and CA-125 blood tests could be considered to screen for these cancers.    There are no clear current screening recommendations for other Ford syndrome-related cancers such as breast cancer, pancreatic cancer, hepatobiliary tract cancer, etc. Screening for these and other cancers may be recommended based on family history.    Pancreatic cancer:  Due to Henrik s family history of pancreatic cancer (in two second-degree relatives, though one individual with pancreatic cancer did not have Ford syndrome by genetic testing), screening for pancreatic cancer may be considered (Joelle et al, Gut 2013; 62: 339-347)(Russel S, et al., Am J Gastroenterol 2015; 110:223-262).  The specific type of screening and recommendations on when screening should begin should be discussed with a physician.     Although no specific screening guidelines exist for sebaceous neoplasms, it may be reasonable for Henrik to see a dermatologist. Henrik's mother reports that her brother (who has not been tested for Ford syndrome, but is at 50% risk) does have lesions removed from his face quite often, but she is not sure what these are. She plans to ask him about this.     We discussed that Henrik could participate in our Cancer Risk Management Program in which our clinical nurse specialist provides an individual  screening plan and assists with medical management.  He was agreeable to this, and a referral was made to see MARIA A Niño, for this service.  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     Implications for Family Members:  We reviewed that mutations in MSH2 are inherited in an autosomal dominant pattern. This means that each of Henrik's future children have a 50% chance of inheriting the same mutation.  Henrik's siblings have already been tested for Ford syndrome.  I am happy to help his other relatives connect with a genetic counselor in their area if they would like to discuss testing.    In rare situations in which both parents have a mutation in the MSH2 gene, their children each have a 25% risk for constitutional mismatch repair deficiency syndrome (CMMRD).  CMMRD is a disorder that causes cafe-au-lait spots and childhood cancers.  For individuals of childbearing age with MSH2 mutations, genetic counseling and genetic testing may be advised for their partners.    Support Resources:  I provided Henrik with support resources and a handout from our Cancer Risk Management Program on Ford syndrome (see after-visit summary)    Plan:  1.  I provided Henrik with a copy of his test results and support resources today.  2.  He plans to follow up with his physicians.  3. A referral to MARIA A Niño, was placed.  If  Henrik has additional questions, I encouraged him to contact  me directly at [phone].   Time spent face to face: 15 minutes.  Destinee Jensen MS, [facility]  Certified Genetic Counselor  P. [phone]  F. [phone]      Again, thank you for allowing me to participate in the care of your patient.      Sincerely,    Destinee Jensen GC

## 2017-04-05 NOTE — PROGRESS NOTES
"4/5/2017    Referring Provider: self-referred    Presenting Information:   Henrik returned to the Cancer Risk Management Program at the [facility] with his mother today to discuss his genetic testing results. His blood was drawn on 3/1/2017 and we ordered MSH2 site specific analysis testing from [facility]. This testing was done because of his family history of Ford syndrome.     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 Specifically his mutation is called c.2131C>T (p.[id number]*). This mutation was historically called R711X (2131C>T). This test only looked for one mutation.  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 We reviewed the impact of this testing on Henrik today, though he and his mother are already very familiar with Ford syndrome, given the family history.     A copy of the test report can be found in the Laboratory tab, dated 3/1/2017, and named \"SEND OUTS MISC TEST\". The report is scanned in as a linked document.    Cancer Risks:  Individuals with MSH2 mutations:    Have a 52-82% lifetime risk of developing colon cancer.     Lifetime endometrial (uterine) cancer risk of 25-60%.    Lifetime ovarian cancer risk of approximately 4-12%.    Additional risks are seen for stomach (6-13%), small bowel (3-6%), urinary tract (1-7%), pancreatic (1-6%), brain (1-3%), and hepatobiliary tract cancers (1.4-4%).     Some families also have increased risk for sebaceous neoplasms (1-9%).      Cancer Screening and Prevention:  The following screening (NCCN guidelines) is recommended for individuals who have Ford syndrome due to a mutation in the MSH2 gene:    Colonoscopies starting at age 20-25 (or earlier based on family history), repeated every 1-2 years.    Possible consideration of upper endoscopy (EGD) with extended duodenoscopy starting at age 30-35, repeated every 3-5 " years. This recommendation is largely dependent on family history.    Consider annual urinalysis starting at age 30-35.     Annual physical/neurological exams starting at 25-30.      When finished planning their families, women are encouraged to consider hysterectomy and removal of the ovaries due to the limitations in screening for these types of cancer.  However, transvaginal ultrasound, endometrial biopsy, and CA-125 blood tests could be considered to screen for these cancers.    There are no clear current screening recommendations for other Ford syndrome-related cancers such as breast cancer, pancreatic cancer, hepatobiliary tract cancer, etc. Screening for these and other cancers may be recommended based on family history.    Pancreatic cancer:  Due to Henrik s family history of pancreatic cancer (in two second-degree relatives, though one individual with pancreatic cancer did not have Ford syndrome by genetic testing), screening for pancreatic cancer may be considered (Joelle et al, Gut 2013; 62: 339-347)(Russel S, et al., Am J Gastroenterol 2015; 110:223-262).  The specific type of screening and recommendations on when screening should begin should be discussed with a physician.     Although no specific screening guidelines exist for sebaceous neoplasms, it may be reasonable for Henrik to see a dermatologist. Henrik's mother reports that her brother (who has not been tested for Ford syndrome, but is at 50% risk) does have lesions removed from his face quite often, but she is not sure what these are. She plans to ask him about this.     We discussed that Henrik could participate in our Cancer Risk Management Program in which our clinical nurse specialist provides an individual screening plan and assists with medical management.  He was agreeable to this, and a referral was made to see MARIA A Niño, for this service.  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     Implications  for Family Members:  We reviewed that mutations in MSH2 are inherited in an autosomal dominant pattern. This means that each of Henrik's future children have a 50% chance of inheriting the same mutation.  Henrik's siblings have already been tested for Ford syndrome.  I am happy to help his other relatives connect with a genetic counselor in their area if they would like to discuss testing.    In rare situations in which both parents have a mutation in the MSH2 gene, their children each have a 25% risk for constitutional mismatch repair deficiency syndrome (CMMRD).  CMMRD is a disorder that causes cafe-au-lait spots and childhood cancers.  For individuals of childbearing age with MSH2 mutations, genetic counseling and genetic testing may be advised for their partners.    Support Resources:  I provided Henrik with support resources and a handout from our Cancer Risk Management Program on Ford syndrome (see after-visit summary)    Plan:  1.  I provided Henrik with a copy of his test results and support resources today.  2.  He plans to follow up with his physicians.  3. A referral to MARIA A Niño, was placed.  If  Henrik has additional questions, I encouraged him to contact  me directly at [phone].   Time spent face to face: 15 minutes.  Destinee Jensen MS, [facility]  Certified Genetic Counselor  P. [phone]  F. [phone]

## 2017-06-28 ENCOUNTER — ONCOLOGY VISIT (OUTPATIENT)
Dept: ONCOLOGY | Facility: CLINIC | Age: 22
End: 2017-06-28
Payer: COMMERCIAL

## 2017-06-28 VITALS
OXYGEN SATURATION: 98 % | HEIGHT: 69 IN | DIASTOLIC BLOOD PRESSURE: 74 MMHG | HEART RATE: 81 BPM | TEMPERATURE: 96.8 F | WEIGHT: 193.7 LBS | BODY MASS INDEX: 28.69 KG/M2 | SYSTOLIC BLOOD PRESSURE: 110 MMHG | RESPIRATION RATE: 16 BRPM

## 2017-06-28 DIAGNOSIS — Z15.09 MSH2-RELATED LYNCH SYNDROME (HNPCC1): Primary | ICD-10-CM

## 2017-06-28 DIAGNOSIS — Z80.0 FAMILY HISTORY OF COLON CANCER: ICD-10-CM

## 2017-06-28 PROCEDURE — 99215 OFFICE O/P EST HI 40 MIN: CPT | Performed by: CLINICAL NURSE SPECIALIST

## 2017-06-28 ASSESSMENT — PAIN SCALES - GENERAL: PAINLEVEL: NO PAIN (0)

## 2017-06-28 NOTE — LETTER
Cancer Risk Management  Program Locations    [facility]  [facility]  Mailing Address  Cancer Risk Management Program  [facility]  [address]  [place] [address]    New patient appointments  [phone]  June 28, 2017    Henrik Alexander  [address] [facility] [address]      Dear Henrik,    It was a pleasure meeting with you today.  Below is a copy of my note from our visit, outlining your surveillance plan.      I look forward to seeing you in the future to coordinate your care and reduce your health risks. Please feel free to contact me if you have any questions or concerns.      Oncology Risk Management Consultation:  Date on this visit: 6/28/2017    Henrik Alexander  is referred by Destinee Jensen, Certified Genetic Counselor, for an oncology risk management consultation. He requires evaluation for her risk of cancer secondary to having a family history of Ford Syndrome and a new diagnosis of an MSH2+ mutation. He is here today with his mother, Flores.    Primary Physician: No primary care provider on file.     History Of Present Illness:  Mr. Alexander is a very pleasant, healthy 21 year old male who presents with family history of Ford Syndrome and a personal diagnosis of an MSH2+ mutation.    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  Specifically, the genetic mutation is c.2131C>T (p.[id number]*), which is the same genetic mutation as his mother and siblings.    Pertinent History:  No history of cancer.  No history of screening.    Review of Systems:  GENERAL: No change in weight, sleep or appetite.  Normal energy.  No fever or chills  EYES: Negative for vision changes or eye problems  ENT: No problems with ears,  nose or throat.  No difficulty swallowing.  RESP: No coughing, wheezing or shortness of breath  CV: No chest pains or palpitations  GI: No nausea, vomiting,  heartburn, abdominal pain, diarrhea, constipation or change in bowel habits  : No urinary frequency or dysuria, bladder or kidney problems  MUSCULOSKELETAL: No significant muscle or joint pains  NEUROLOGIC: No headaches, numbness, tingling, weakness, problems with balance or coordination  PSYCHIATRIC: No problems with anxiety, depression or mental health  HEME/IMMUNE/ALLERGY: No history of bleeding or clotting problems or anemia.  No allergies or immune system problems  ENDOCRINE: No history of thyroid disease, diabetes or other endocrine disorders  SKIN: Red, raised diffuse papillar rash on forearms, bilaterally.     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    Allergies:  Allergies as of 06/28/2017     (Not on File)       Current Medications:  No current outpatient prescriptions on file.        Family History:  Family History   Problem Relation Age of Onset     Colon Cancer Mother 25     Genetic Disorder Mother      MSH2+ (Ford syndrome)     Endometrial Cancer Mother 44     Genetic Disorder Sister      MSH2+     Genetic Disorder Brother      MSH2+     Pancreatic Cancer Maternal Grandfather 70     Colon Cancer Maternal Grandfather 40     Pancreatic Cancer Maternal Aunt 59     hx of smoking     Colon Cancer Maternal Aunt 51     mets to bladder       Social History:  Social History     Social History     Marital status: Single     Spouse name: N/A     Number of children: N/A     Years of education: N/A     Occupational History      Unknown     Social History Main Topics     Smoking status: Not on file     Smokeless tobacco: Not on file     Alcohol use Not on file     Drug use: Not on file      Sexual activity: Not on file     Other Topics Concern     Not on file     Social History Narrative       Physical Exam:  There were no vitals taken for this visit.  Physical exam deferred.     Laboratory/Imaging Studies  Results for orders placed or performed in visit on 03/01/17   Ford syndrome - single site - [facility] TEST: Laboratory Miscellaneous Order   Result Value Ref Range    Miscellaneous Test       Specimen Received, Reordered and sent to Performing laboratory - Report to   follow upon completion.         ASSESSMENT  We discussed the differences between cancer risk for the general population and those with MSH2+ mutations.  We also discussed that inheriting a mutation does not mean that a person will develop cancer, but rather that they are at increased risk.   Henrik was given printed materials from the [employer], including The Patient's Guide to Ford Syndrome, which includes signs/symptoms of concern to be mindful of and an explanation of how MSH2 works in the body.    We reviewed that:  People with an MSH2+ mutation have between 40-80% lifetime risk of colon cancer, compared to the 4.5% risk within the general population.  I have ordered a colonoscopy for him today.  Women with an MSH2+ bear a 25-60% lifetime risk of endometrial cancer, compared to the 2.7% lifetime risk within the general population.  They also have a 4-24% lifetime risk of developing ovarian cancer, compared to the 1.6% risk in the general population. People with and MSH2 mutation  have a lifetime risk of 1-13% of stomach cancer, compared with <1% in the general population.     They are also at higher risk than the general population for hepatobiliary tract cancers (1.4-4% lifetime risk), small bowel cancer (3-6% lifetime risk), brain and central nervous system (1-3% lifetime risk), sebaceous neoplasms (1-9% lifetime risk) and pancreatic cancers (1-6% lifetime risk). The general population has <1% risk of  these types of cancers.Henrik works outdoors doing construction; we reviewed pictures of both melanoma and sebaceous adenomas.  He is aware that he should use sunscreen when outdoors. We discussed that it is likely his rash on his forearms was due to a skin irritation or bacterial infection.  I am referring him to [facility] at [place] for a full head to toe examination.     We discussed that he may have screening for pancreatic cancer in the future because of his family history.  His maternal great grandfather may have had a history of stomach cancer; he lived into his 60s.    INDIVIDUALIZED CANCER RISK MANAGEMENT PLAN:  Individualized Surveillance Plan for Ford Syndrome   (MLH1, MSH2, MSH6, PMS2 and EPCAM mutation carriers)   Based on NCCN Guidelines Version 1.2017   Type of Screening Recommendation Last Done Next Due   Colon Cancer Screening Colonoscopy at age 20-25 years or 2-5 years prior to the earliest colon cancer in the family if it is diagnosed prior to age 25.     Repeat every 1-2 years.    Ordered today   2018   Endometrial and Ovarian Cancer Prophylactic hysterectomy and bilateral salpingo-oophorectomy (BSO) should be considered by women who have completed childbearing.   NA   NA    Annual endometrial sampling is an option; women should be aware that dysfunctional uterine bleeding warrants evaluation.   NA   NA    Annual transvaginal ultrasound may be considered at a clinician s discretion. NA NA    Serum CA-125 tests may be considered at a clinician s discretion. NA NA   Gastric and small bowel cancer Selected individuals or families or those of  descent may consider EGD with extended duodenoscopy (to distal duodenum or into the jejunum) every 3-5 years beginning at age 30-35.   None   Consider in the future   Urothelial cancer Consider annual urinalysis at age 30-35. NA Age 30   Central Nervous System cancer Annual physical/neurological examinations starting at age 25-30. NA Age 25-30    There are data to suggest that aspirin may decrease the risk of colon cancer in Ford Syndrome.  However, optimal dose and duration of aspirin therapy is uncertain.    Despite data indicating an increased risk for pancreatic cancer, no effective screening techniques have been identified.    There have been suggestions that there is an increased risk for breast cancer in Ford Syndrome patients; however, there is not enough evidence to support increased screening above average risk breast cancer screening.    There have been suggestions that there is an increased risk for breast cancer in Ford syndrome patients; however, due to limited data, no screening recommendation is possible at this time.     I look forward to working with him to manage his cancer risk and will monitor his screenings and follow up with him in one year.    I spent 43 minutes with the patient with greater that 50% of it in counseling and coordinating care as documented above.    Julianna Alberts, MARIA A-CNS, OCN, ANG-BC  Clinical Nurse Specialist  Cancer Risk Management Program  [address]  [place] [address]    phone:  [phone]  Pager: [phone]  fax: [phone]    Cc: No primary care provider on file.

## 2017-06-28 NOTE — PATIENT INSTRUCTIONS
Individualized Surveillance Plan for Ford Syndrome   (MLH1, MSH2, MSH6, PMS2 and EPCAM mutation carriers)   Based on NCCN Guidelines Version 1.2017   Type of Screening Recommendation Last Done Next Due   Colon Cancer Screening Colonoscopy at age 20-25 years or 2-5 years prior to the earliest colon cancer in the family if it is diagnosed prior to age 25.     Repeat every 1-2 years.    Ordered today   2018   Endometrial and Ovarian Cancer Prophylactic hysterectomy and bilateral salpingo-oopherectomy (BSO) should be considered by women who have completed childbearing.   NA   NA    Annual endometrial sampling is an option; women should be aware that dysfunctional uterine bleeding warrants evaluation.   NA   NA    Annual transvaginal ultrasound may be considered at a clinician s discretion. NA NA    Serum CA-125 tests may be considered at a clinician s discretion. NA NA   Gastric and small bowel cancer Selected individuals or families or those of  descent may consider EGD with extended duodenoscopy (to distal duodenum or into the jejunum) every 3-5 years beginning at age 30-35.   None   Consider in the future   Urothelial cancer Consider annual urinalysis at age 30-35. NA Age 30   Central Nervous System cancer Annual physical/neurological examinations starting at age 25-30. NA Age 25-30   There are data to suggest that aspirin may decrease the risk of colon cancer in Ford Syndrome.  However, optimal dose and duration of aspirin therapy is uncertain.    Despite data indicating an increased risk for pancreatic cancer, no effective screening techniques have been identified.    There have been suggestions that there eis an increased risk for breast cancer in Ford Syndrome patients; however, there is not enough evidence to support increased screening above average risk breast cancer screening.    There have been suggestions that there is an increased risk for breast cancer in Ford syndrome patients; however, due to  limited data, no screening recommendation is possible at this time.         Colonoscopy     A camera attached to a flexible tube with a viewing lens is used to take video pictures.     Colonoscopy is a test to view the inside of your lower digestive tract (colon and rectum). Sometimes it can show the last part of the small intestine (ileum). During the test, small pieces of tissue may be removed for testing. This is called a biopsy. Small growths, such as polyps, may also be removed.   Why is colonoscopy done?  The test is done to help look for colon cancer. And it can help find the source of abdominal pain, bleeding, and changes in bowel habits. It may be needed once a year, depending on factors such as your:    Age    Health history    Family health history    Symptoms    Results from any prior colonoscopy  Risks and possible complications  These include:    Bleeding                 A puncture or tear in the colon     Risks of anesthesia    A cancer lesion not being seen  Getting ready   To prepare for the test:    Talk with your healthcare provider about the risks of the test (see below). Also ask your healthcare provider about alternatives to the test.    Tell your healthcare provider about any medicines you take. Also tell him or her about any health conditions you may have.    Make sure your rectum and colon are empty for the test. Follow the diet and bowel prep instructions exactly. If you don t, the test may need to be rescheduled.    Plan for a friend or family member to drive you home after the test.     Colonoscopy provides an inside view of the entire colon.     You may discuss the results with your doctor right away or at a future visit.  During the test   The test is usually done in the hospital on an outpatient basis. This means you go home the same day. The procedure takes about 30 minutes. During that time:    You are given relaxing (sedating) medicine through an IV line. You may be drowsy, or fully  asleep.    The healthcare provider will first give you a physical exam to check for anal and rectal problems.    Then the anus is lubricated and the scope inserted.    If you are awake, you may have a feeling similar to needing to have a bowel movement. You may also feel pressure as air is pumped into the colon. It s OK to pass gas during the procedure.    Biopsy, polyp removal, or other treatments may be done during the test.  After the test   You may have gas right after the test. It can help to try to pass it to help prevent later bloating. Your healthcare provider may discuss the results with you right away. Or you may need to schedule a follow-up visit to talk about the results. After the test, you can go back to your normal eating and other activities. You may be tired from the sedation and need to rest for a few hours.  Date Last Reviewed: 11/1/2016 2000-2017 [employer]. [address] [place] [address], [place] [address]. All rights reserved. This information is not intended as a substitute for professional medical care. Always follow your healthcare professional's instructions.

## 2017-06-28 NOTE — MR AVS SNAPSHOT
After Visit Summary   6/28/2017    Henrik Alexander    MRN: [id number]           Patient Information     Date Of Birth          1995        Visit Information        Provider Department      6/28/2017 8:00 AM Julianna Alberts APRN [facility]        Today's Diagnoses     MSH2-related Ford syndrome (HNPCC1)    -  1    Family history of colon cancer          Care Instructions    Individualized Surveillance Plan for Ford Syndrome   (MLH1, MSH2, MSH6, PMS2 and EPCAM mutation carriers)   Based on NCCN Guidelines Version 1.2017   Type of Screening Recommendation Last Done Next Due   Colon Cancer Screening Colonoscopy at age 20-25 years or 2-5 years prior to the earliest colon cancer in the family if it is diagnosed prior to age 25.     Repeat every 1-2 years.    Ordered today   2018   Endometrial and Ovarian Cancer Prophylactic hysterectomy and bilateral salpingo-oopherectomy (BSO) should be considered by women who have completed childbearing.   NA   NA    Annual endometrial sampling is an option; women should be aware that dysfunctional uterine bleeding warrants evaluation.   NA   NA    Annual transvaginal ultrasound may be considered at a clinician s discretion. NA NA    Serum CA-125 tests may be considered at a clinician s discretion. NA NA   Gastric and small bowel cancer Selected individuals or families or those of  descent may consider EGD with extended duodenoscopy (to distal duodenum or into the jejunum) every 3-5 years beginning at age 30-35.   None   Consider in the future   Urothelial cancer Consider annual urinalysis at age 30-35. NA Age 30   Central Nervous System cancer Annual physical/neurological examinations starting at age 25-30. NA Age 25-30   There are data to suggest that aspirin may decrease the risk of colon cancer in Ford Syndrome.  However, optimal dose and duration of aspirin therapy is uncertain.    Despite data indicating an increased risk  for pancreatic cancer, no effective screening techniques have been identified.    There have been suggestions that there eis an increased risk for breast cancer in Ford Syndrome patients; however, there is not enough evidence to support increased screening above average risk breast cancer screening.    There have been suggestions that there is an increased risk for breast cancer in Ford syndrome patients; however, due to limited data, no screening recommendation is possible at this time.         Colonoscopy     A camera attached to a flexible tube with a viewing lens is used to take video pictures.     Colonoscopy is a test to view the inside of your lower digestive tract (colon and rectum). Sometimes it can show the last part of the small intestine (ileum). During the test, small pieces of tissue may be removed for testing. This is called a biopsy. Small growths, such as polyps, may also be removed.   Why is colonoscopy done?  The test is done to help look for colon cancer. And it can help find the source of abdominal pain, bleeding, and changes in bowel habits. It may be needed once a year, depending on factors such as your:    Age    Health history    Family health history    Symptoms    Results from any prior colonoscopy  Risks and possible complications  These include:    Bleeding                 A puncture or tear in the colon     Risks of anesthesia    A cancer lesion not being seen  Getting ready   To prepare for the test:    Talk with your healthcare provider about the risks of the test (see below). Also ask your healthcare provider about alternatives to the test.    Tell your healthcare provider about any medicines you take. Also tell him or her about any health conditions you may have.    Make sure your rectum and colon are empty for the test. Follow the diet and bowel prep instructions exactly. If you don t, the test may need to be rescheduled.    Plan for a friend or family member to drive you home  after the test.     Colonoscopy provides an inside view of the entire colon.     You may discuss the results with your doctor right away or at a future visit.  During the test   The test is usually done in the hospital on an outpatient basis. This means you go home the same day. The procedure takes about 30 minutes. During that time:    You are given relaxing (sedating) medicine through an IV line. You may be drowsy, or fully asleep.    The healthcare provider will first give you a physical exam to check for anal and rectal problems.    Then the anus is lubricated and the scope inserted.    If you are awake, you may have a feeling similar to needing to have a bowel movement. You may also feel pressure as air is pumped into the colon. It s OK to pass gas during the procedure.    Biopsy, polyp removal, or other treatments may be done during the test.  After the test   You may have gas right after the test. It can help to try to pass it to help prevent later bloating. Your healthcare provider may discuss the results with you right away. Or you may need to schedule a follow-up visit to talk about the results. After the test, you can go back to your normal eating and other activities. You may be tired from the sedation and need to rest for a few hours.  Date Last Reviewed: 11/1/2016 2000-2017 [employer]. [address] [place] [address]. All rights reserved. This information is not intended as a substitute for professional medical care. Always follow your healthcare professional's instructions.                Follow-ups after your visit        Additional Services     DERMATOLOGY REFERRAL       Your provider has referred you to: [facility]: [facility] [phone]   [email or web address]    Please be aware that coverage of these services is subject to the terms and limitations of your health insurance plan.  Call member  services at your health plan with any benefit or coverage questions.      Please bring the following to your appointment:  Any x-rays, CTs or MRIs which have been performed.  Contact the facility where they were done to arrange for  prior to your scheduled appointment.  Any new CT, MRI or other procedures ordered by your specialist must be performed at a [facility] facility or coordinated by your clinic's referral office.    List of current medications   This referral request   Any documents/labs given to you for this referral            GASTROENTEROLOGY ADULT REF PROCEDURE ONLY       Last Lab Result: No results found for: CR  There is no height or weight on file to calculate BMI.     Needed:  No  Language:  English    Patient will be contacted to schedule procedure.     Please be aware that coverage of these services is subject to the terms and limitations of your health insurance plan.  Call member services at your health plan with any benefit or coverage questions.  Any procedures must be performed at a [facility] facility OR coordinated by your clinic's referral office.    Please bring the following with you to your appointment:    (1) Any X-Rays, CTs or MRIs which have been performed.  Contact the facility where they were done to arrange for  prior to your scheduled appointment.    (2) List of current medications   (3) This referral request   (4) Any documents/labs given to you for this referral                  Follow-up notes from your care team     Return in about 1 year (around 6/28/2018) for Physical Exam.      Your next 10 appointments already scheduled     Jul 07, 2017  8:30 AM CDT   Colonoscopy with Roland Bruno MD   [facility] ([facility])    [address] [place] [id number]   [phone]            Sep 13, 2017  8:30 AM CDT   New Visit with Dylan Armenta MD   [facility] ([facility])    [address] [address] [place] [id number] [phone]            Jun 27, 2018  9:30 AM CDT   [facility] MARIA A Last   [facility]    [address] [address] [place] [id number] [phone]              Who to contact     If you have questions or need follow up information about today's clinic visit or your schedule please contact [facility] directly at [phone].  Normal or non-critical lab and imaging results will be communicated to you by Palamidahart, letter or phone within 4 business days after the clinic has received the results. If you do not hear from us within 7 days, please contact the clinic through Palamidahart or phone. If you have a critical or abnormal lab result, we will notify you by phone as soon as possible.  Submit refill requests through [facility] or call your pharmacy and they will forward the refill request to us. Please allow 3 business days for your refill to be completed.          Additional Information About Your Visit        MyChart Information     W[employer]ai gives you secure access to your electronic health record. If you see a primary care provider, you can also send messages to your care team and make appointments. If you have questions, please call your primary care clinic.  If you do not have a primary care provider, please call [phone] and they will assist you.      Waicai is an electronic gateway that provides easy, online access to your medical records. With Waicai, you can request a clinic appointment, read your test results, renew a prescription or communicate with your care team.     To access your existing account, please contact your [facility] or call [phone] for assistance.        Care EveryWhere ID     This is your Care EveryWhere ID. This could be used by other organizations to access your [facility]  "records  [id number]        Your Vitals Were     Pulse Temperature Respirations Height Pulse Oximetry BMI (Body Mass Index)    81 96.8  F (36  C) (Oral) 16 1.753 m (5' 9\") 98% 28.6 kg/m2       Blood Pressure from Last 3 Encounters:   06/28/17 110/74    Weight from Last 3 Encounters:   06/28/17 87.9 kg (193 lb 11.2 oz) (90 %)*     * Growth percentiles are based on [facility] 2-20 Years data.              We Performed the Following     DERMATOLOGY REFERRAL     GASTROENTEROLOGY ADULT REF PROCEDURE ONLY        Primary Care Provider    None Specified       No primary provider on file.        Equal Access to Services     [facility]: Hadii fidelia Alexis, cesar rivers, curt hopson, dior vargas . So [facility] [phone].    ATENCIÓN: Si habla español, tiene a seth disposición servicios gratuitos de asistencia lingüística. Llame al [phone].    We comply with applicable federal civil rights laws and [place] laws. We do not discriminate on the basis of race, color, national origin, age, disability sex, sexual orientation or gender identity.            Thank you!     Thank you for choosing [facility]  for your care. Our goal is always to provide you with excellent care. Hearing back from our patients is one way we can continue to improve our services. Please take a few minutes to complete the written survey that you may receive in the mail after your visit with us. Thank you!             Your Updated Medication List - Protect others around you: Learn how to safely use, store and throw away your medicines at www.disposemymeds.org.      Notice  As of 6/28/2017  9:11 AM    You have not been prescribed any medications.      "

## 2017-06-28 NOTE — PROGRESS NOTES
Oncology Risk Management Consultation:  Date on this visit: 6/28/2017    Henrik Alexander  is referred by Destinee Jensen, Certified Genetic Counselor, for an oncology risk management consultation. He requires evaluation for her risk of cancer secondary to having a family history of Ford Syndrome and a new diagnosis of an MSH2+ mutation. He is here today with his mother, Flores.    Primary Physician: No primary care provider on file.     History Of Present Illness:  Mr. Alexander is a very pleasant, healthy 21 year old male who presents with family history of Ford Syndrome and a personal diagnosis of an MSH2+ mutation.    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  Specifically, the genetic mutation is c.2131C>T (p.Ncp130*), which is the same genetic mutation as his mother and siblings.    Pertinent History:  No history of cancer.  No history of screening.    Review of Systems:  GENERAL: No change in weight, sleep or appetite.  Normal energy.  No fever or chills  EYES: Negative for vision changes or eye problems  ENT: No problems with ears, nose or throat.  No difficulty swallowing.  RESP: No coughing, wheezing or shortness of breath  CV: No chest pains or palpitations  GI: No nausea, vomiting,  heartburn, abdominal pain, diarrhea, constipation or change in bowel habits  : No urinary frequency or dysuria, bladder or kidney problems  MUSCULOSKELETAL: No significant muscle or joint pains  NEUROLOGIC: No headaches, numbness, tingling, weakness, problems with balance or coordination  PSYCHIATRIC: No problems with anxiety, depression or mental health  HEME/IMMUNE/ALLERGY: No history of bleeding or clotting problems or anemia.  No allergies or immune system problems  ENDOCRINE: No history of thyroid disease, diabetes or other endocrine disorders  SKIN: Red, raised diffuse papillar rash on forearms, bilaterally.     Past  Medical/Surgical History:  Past Medical History:   Diagnosis Date     MSH2-related Ford syndrome 4/5/2017    Tested at [facility] on 3/1/2017 Mutation is c.2131C>T (p.Jcq357*)     No past surgical history on file.    Allergies:  Allergies as of 06/28/2017     (Not on File)       Current Medications:  No current outpatient prescriptions on file.        Family History:  Family History   Problem Relation Age of Onset     Colon Cancer Mother 25     Genetic Disorder Mother      MSH2+ (Ford syndrome)     Endometrial Cancer Mother 44     Genetic Disorder Sister      MSH2+     Genetic Disorder Brother      MSH2+     Pancreatic Cancer Maternal Grandfather 70     Colon Cancer Maternal Grandfather 40     Pancreatic Cancer Maternal Aunt 59     hx of smoking     Colon Cancer Maternal Aunt 51     mets to bladder       Social History:  Social History     Social History     Marital status: Single     Spouse name: N/A     Number of children: N/A     Years of education: N/A     Occupational History      Unknown     Social History Main Topics     Smoking status: Not on file     Smokeless tobacco: Not on file     Alcohol use Not on file     Drug use: Not on file     Sexual activity: Not on file     Other Topics Concern     Not on file     Social History Narrative       Physical Exam:  There were no vitals taken for this visit.  Physical exam deferred.     Laboratory/Imaging Studies  Results for orders placed or performed in visit on 03/01/17   Ford syndrome - single site - [facility] TEST: Laboratory Miscellaneous Order   Result Value Ref Range    Miscellaneous Test       Specimen Received, Reordered and sent to Performing laboratory - Report to   follow upon completion.         ASSESSMENT  We discussed the differences between cancer risk for the general population and those with MSH2+ mutations.  We also discussed that inheriting a mutation does not mean that a person will develop cancer, but rather that they are at increased risk.   Henrik  was given printed materials from the [employer], including The Patient's Guide to Ford Syndrome, which includes signs/symptoms of concern to be mindful of and an explanation of how MSH2 works in the body.    We reviewed that:  People with an MSH2+ mutation have between 40-80% lifetime risk of colon cancer, compared to the 4.5% risk within the general population.  I have ordered a colonoscopy for him today.  Women with an MSH2+ bear a 25-60% lifetime risk of endometrial cancer, compared to the 2.7% lifetime risk within the general population.  They also have a 4-24% lifetime risk of developing ovarian cancer, compared to the 1.6% risk in the general population. People with and MSH2 mutation  have a lifetime risk of 1-13% of stomach cancer, compared with <1% in the general population.     They are also at higher risk than the general population for hepatobiliary tract cancers (1.4-4% lifetime risk), small bowel cancer (3-6% lifetime risk), brain and central nervous system (1-3% lifetime risk), sebaceous neoplasms (1-9% lifetime risk) and pancreatic cancers (1-6% lifetime risk). The general population has <1% risk of these types of cancers.Henrik works outdoors doing construction; we reviewed pictures of both melanoma and sebaceous adenomas.  He is aware that he should use sunscreen when outdoors. We discussed that it is likely his rash on his forearms was due to a skin irritation or bacterial infection.  I am referring him to Dermatology at [facility] for a full head to toe examination.     We discussed that he may have screening for pancreatic cancer in the future because of his family history.  His maternal great grandfather may have had a history of stomach cancer; he lived into his 60s.    INDIVIDUALIZED CANCER RISK MANAGEMENT PLAN:  Individualized Surveillance Plan for Ford Syndrome   (MLH1, MSH2, MSH6, PMS2 and EPCAM mutation carriers)   Based on NCCN Guidelines Version 1.2017   Type of  Screening Recommendation Last Done Next Due   Colon Cancer Screening Colonoscopy at age 20-25 years or 2-5 years prior to the earliest colon cancer in the family if it is diagnosed prior to age 25.     Repeat every 1-2 years.    Ordered today   2018   Endometrial and Ovarian Cancer Prophylactic hysterectomy and bilateral salpingo-oophorectomy (BSO) should be considered by women who have completed childbearing.   NA   NA    Annual endometrial sampling is an option; women should be aware that dysfunctional uterine bleeding warrants evaluation.   NA   NA    Annual transvaginal ultrasound may be considered at a clinician s discretion. NA NA    Serum CA-125 tests may be considered at a clinician s discretion. NA NA   Gastric and small bowel cancer Selected individuals or families or those of  descent may consider EGD with extended duodenoscopy (to distal duodenum or into the jejunum) every 3-5 years beginning at age 30-35.   None   Consider in the future   Urothelial cancer Consider annual urinalysis at age 30-35. NA Age 30   Central Nervous System cancer Annual physical/neurological examinations starting at age 25-30. NA Age 25-30   There are data to suggest that aspirin may decrease the risk of colon cancer in Ford Syndrome.  However, optimal dose and duration of aspirin therapy is uncertain.    Despite data indicating an increased risk for pancreatic cancer, no effective screening techniques have been identified.    There have been suggestions that there is an increased risk for breast cancer in Ford Syndrome patients; however, there is not enough evidence to support increased screening above average risk breast cancer screening.    There have been suggestions that there is an increased risk for breast cancer in Ford syndrome patients; however, due to limited data, no screening recommendation is possible at this time.     I look forward to working with him to manage his cancer risk and will monitor his  screenings and follow up with him in one year.    I spent 43 minutes with the patient with greater that 50% of it in counseling and coordinating care as documented above.    Julianna Alberts, MARIA A-CNS, OCN, ANG-BC  Clinical Nurse Specialist  Cancer Risk Management Program  [address]  [place] [address]    phone:  [phone]  Pager: [phone]  fax: [phone]    Cc: No primary care provider on file.

## 2017-07-05 ENCOUNTER — TELEPHONE (OUTPATIENT)
Dept: GASTROENTEROLOGY | Facility: CLINIC | Age: 22
End: 2017-07-05

## 2017-07-18 ENCOUNTER — TELEPHONE (OUTPATIENT)
Dept: DERMATOLOGY | Facility: CLINIC | Age: 22
End: 2017-07-18

## 2017-07-18 NOTE — TELEPHONE ENCOUNTER
I left a message for patient to call [facility].  Patient has an appt with Dr. Armenta 9/13/17.  Given patient's family history of Ford syndrome and his MSH2+ mutation Dr. Armenta would like to see him before he leaves.  Please move up appointment.  Pattie Horne RN

## 2017-07-21 NOTE — TELEPHONE ENCOUNTER
This CMA left message for pt to call clinic back @ [phone] to schedule sooner appt.     Barry Schwarz CMA

## 2017-07-26 ENCOUNTER — TELEPHONE (OUTPATIENT)
Dept: ONCOLOGY | Facility: CLINIC | Age: 22
End: 2017-07-26

## 2017-07-31 NOTE — TELEPHONE ENCOUNTER
Called and spoke with pt.  Read back to pt message below.  Pt verbalized understanding and agreed with plan.  appt 9/13/17 at 8:30am rescheduled to 08/09/17 at 9:30am with Dr. Armenta.    Advised to call sooner if any further questions or concerns.    Anabell Sarabia LPN

## 2017-08-04 ENCOUNTER — TELEPHONE (OUTPATIENT)
Dept: GASTROENTEROLOGY | Facility: OUTPATIENT CENTER | Age: 22
End: 2017-08-04

## 2017-08-08 ENCOUNTER — TELEPHONE (OUTPATIENT)
Dept: GASTROENTEROLOGY | Facility: OUTPATIENT CENTER | Age: 22
End: 2017-08-08

## 2017-08-08 DIAGNOSIS — Z15.09 MSH2-RELATED LYNCH SYNDROME (HNPCC1): Primary | ICD-10-CM

## 2017-08-08 NOTE — TELEPHONE ENCOUNTER
Patient taking any blood thinners ? no    Heart disease ? denies    Lung disease ?denies      Sleep apnea ?denies    Diabetic ?denies    Kidney disease ?denies    Dialysis ? n/a    Electronic implanted medical devices ?denies    Are you taking any narcotic pain medication ?no   What is your daily dosage ?    PTSD ? n/a    Prep instructions reviewed with patient ? Patient declined review.  policy, MAC sedation plan reviewed. Advised patient to have someone stay with him post exam    Pharmacy : [facility]    Indication for procedure :  Associated Diagnoses      MSH2-related Ford syndrome (HNPCC1) [Z15.09]  - Primary         Family history of colon cancer [Z80.0]             Referring provider :  Providers      Authorizing Provider Encounter Provider     Julianna lAberts APRN CNS

## 2017-08-09 ENCOUNTER — OFFICE VISIT (OUTPATIENT)
Dept: DERMATOLOGY | Facility: CLINIC | Age: 22
End: 2017-08-09
Attending: CLINICAL NURSE SPECIALIST
Payer: COMMERCIAL

## 2017-08-09 DIAGNOSIS — L73.9 FOLLICULITIS: ICD-10-CM

## 2017-08-09 DIAGNOSIS — L70.0 ACNE VULGARIS: ICD-10-CM

## 2017-08-09 DIAGNOSIS — B35.3 TINEA PEDIS OF BOTH FEET: ICD-10-CM

## 2017-08-09 DIAGNOSIS — D22.9 MULTIPLE NEVI: ICD-10-CM

## 2017-08-09 DIAGNOSIS — Z15.09 MSH2-RELATED LYNCH SYNDROME (HNPCC1): Primary | ICD-10-CM

## 2017-08-09 DIAGNOSIS — L90.5 ACNE SCARRING: ICD-10-CM

## 2017-08-09 PROCEDURE — 99203 OFFICE O/P NEW LOW 30 MIN: CPT | Performed by: DERMATOLOGY

## 2017-08-09 PROCEDURE — 87070 CULTURE OTHR SPECIMN AEROBIC: CPT | Performed by: DERMATOLOGY

## 2017-08-09 RX ORDER — TRETINOIN 0.25 MG/G
CREAM TOPICAL
Qty: 20 G | Refills: 4 | Status: SHIPPED | OUTPATIENT
Start: 2017-08-09

## 2017-08-09 RX ORDER — CLINDAMYCIN PHOSPHATE 10 MG/ML
SOLUTION TOPICAL
Qty: 60 EACH | Refills: 3 | Status: SHIPPED | OUTPATIENT
Start: 2017-08-09

## 2017-08-09 NOTE — LETTER
"8/9/2017       RE: Henrik Alexander  [address] [facility] [address]     Dear Colleague,    Thank you for referring your patient, Henrik Alexander, to the [facility]. Please see a copy of my visit note below.    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 Yearly skin check. Please get pt into clinic ASAP for any new concerning lesions for biopsy.  2.Acne vulgaris with acne scarring  -Current tx: OTC BPO, clindamycin 1% wipes, tretinoin 0.025% cream  3.Folliculitis, culture 8/9/2017, OTC BPO  4.Tinea pedis  -Current tx: OTC antifungal cream    Encounter Date: Aug 9, 2017    CC:  Chief Complaint   Patient presents with     Derm Problem     skin check hx of MSH2- has spots on bilat forearms he wants looked at       History of Present Illness:  Mr. Henrik Alexander is a 21 year old male with a hx of MSH2-related Ford syndrome presents for a skin check. Pt was diagnosed with Ford syndrome a few weeks ago, this runs in the family. He's had positive genetic testing. He will be getting a colonoscopy soon. The only lesions of concern are \"pimple-like\" spots on his dorsal forearms that are non-pruritic but pt still admits to scratching at the lesions occasionally. The lesions have been present for about 3-4 years. Pt participated in high school sports and works as a  and tries to shower soon after work.     Pt has some acne on the face and uses Old Spice body wash. He washes his face daily in the shower, more often at night. No other topical acne medications. Pt reports having worse acne previously and his face is greasier rather than drier. He has had a history of worse acne but it has gotten better as he got older. No other skin lesions of concern.     Pt is with his mother today.    Past Medical History:   Patient Active Problem List "   Diagnosis     MSH2-related Ford syndrome     Past Medical History:   Diagnosis Date     MSH2-related Ford syndrome 4/5/2017    Tested at [employer] on 3/1/2017 Mutation is c.2131C>T (p.[id number]*)     History reviewed. No pertinent surgical history.    Social History:  The patient is a student and works as a manual . The patient denies use of tanning beds.    Family History:  There is no family history of skin cancer. There is a family hx of MSH2-related Ford syndrome.    Medications:  No current outpatient prescriptions on file.       Not on File    Review of Systems:  -Skin/Heme New Pt: The patient admits to frequent sun exposure. The patient denies excessive scarring or problems healing except as per HPI. The patient denies excessive bleeding.  -Constitutional: The patient denies fatigue, fevers, chills, unintended weight loss, and night sweats.    Physical exam:  Vitals: There were no vitals taken for this visit.  GEN: This is a well developed, well-nourished male in no acute distress, in a pleasant mood.    NEURO: Alert and oriented  PSYCH: In pleasant mood, appropriate affect  SKIN: Total skin excluding the undergarment areas was performed. The exam included the head/face, neck, both arms, chest, back, abdomen, both legs, digits and/or nails.   -Skin type 1  -there are pink follicular based papules on the bilateral forearms, upper arms, and back with some pustule formation  -there is some mild wet scaling between toe webs on the bilateral feet  -there are pink acneiform papules on the forehead and the temples  -Multiple regular brown pigmented macules and papules are identified on examined surfaces.   -No other lesions of concern on areas examined.       Impression/Plan:  1. Hx of MSH2-related Ford syndrome (AKA [place]-Edvin). Baseline check benign. Educated on the 3 skin findings that usually erupt as red bumps on the head and neck with or without scale: Sebaceous carcinoma/Sebaceous  Adenoma/Keratoacathoma. Will need yearly skin check form here on. Emphasize need for sun protection given UV-related DNA damage, especially given his Type 1 skin.    Recommend yearly skin checks. Most recent: 8/9/2017    Sun precaution was advised including the use of sun screens of SPF 30 or higher, sun protective clothing, and avoidance of tanning beds.    2. Acne vulgaris with acne scarring on face and back with possible addition of Folliculitis given his work and sweating.    Start OTC BPO wash daily. Warned of bleaching fabrics.     Start clindamycin 1% wipes qam to face.    Start tretinoin 0.025% cream qhs, start every third night, increase by one night every week as tolerated until applying every night    Anticipate possible antibiotic     Skin swab culture of a pustule on the left arm.    3. Multiple nevi    Benign nature was discussed. No further intervention required at this time.     4. Tinea pedis, bilaterally.    OTC antifungal medication x 4 weeks.    CC Julianna Alberts (Onc), Dr. DAMASO Bruno (GI), and KISHA Jensen (Genetics Counselor) on close of this encounter.  Follow-up in 3-4 months for acne. One year for skin check, earlier for new or changing lesions.       Staff Involved:  Scribe/Staff    Scribe Disclosure:   I, Miguel White, am serving as a scribe to document services personally performed by Dr. Dylan Armenta, based on data collection and the provider's statements to me.     Provider Disclosure:   I have reviewed the documentation recorded by the scribe and have edited it as needed. I have personally performed the services documented here and the documentation accurately represents those services and the decisions made by me.     Dylan Armenta MD, MS    Department of Dermatology  [facility]: Phone: [phone], Fax:[phone]  [facility]: Phone: [phone], Fax:  [phone]          Again, thank you for allowing me to participate in the care of your patient.      Sincerely,    Dylan Armenta MD

## 2017-08-09 NOTE — PROGRESS NOTES
"[facility] Dermatology Note      Dermatology Problem List:  1.MSH2-related Ford syndrome (Abigail-Edvin). +FamHx & Genetic testing. Yearly skin check. Please get pt into clinic ASAP for any new concerning lesions for biopsy.  2.Acne vulgaris with acne scarring  -Current tx: OTC BPO, clindamycin 1% wipes, tretinoin 0.025% cream  3.Folliculitis, culture 8/9/2017, OTC BPO  4.Tinea pedis  -Current tx: OTC antifungal cream    Encounter Date: Aug 9, 2017    CC:  Chief Complaint   Patient presents with     Derm Problem     skin check hx of MSH2- has spots on bilat forearms he wants looked at       History of Present Illness:  Mr. Henrik Alexander is a 21 year old male with a hx of MSH2-related Ford syndrome presents for a skin check. Pt was diagnosed with Ford syndrome a few weeks ago, this runs in the family. He's had positive genetic testing. He will be getting a colonoscopy soon. The only lesions of concern are \"pimple-like\" spots on his dorsal forearms that are non-pruritic but pt still admits to scratching at the lesions occasionally. The lesions have been present for about 3-4 years. Pt participated in high school sports and works as a  and tries to shower soon after work.     Pt has some acne on the face and uses Old Spice body wash. He washes his face daily in the shower, more often at night. No other topical acne medications. Pt reports having worse acne previously and his face is greasier rather than drier. He has had a history of worse acne but it has gotten better as he got older. No other skin lesions of concern.     Pt is with his mother today.    Past Medical History:   Patient Active Problem List   Diagnosis     MSH2-related Ford syndrome     Past Medical History:   Diagnosis Date     MSH2-related Ford syndrome 4/5/2017    Tested at [employer] on 3/1/2017 Mutation is c.2131C>T (p.[id number]*)     History reviewed. No pertinent surgical history.    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    Family History:  There is no family history of skin cancer. There is a family hx of MSH2-related Ford syndrome.    Medications:  No current outpatient prescriptions on file.       Not on File    Review of Systems:  -Skin/Heme New Pt: The patient admits to frequent sun exposure. The patient denies excessive scarring or problems healing except as per HPI. The patient denies excessive bleeding.  -Constitutional: The patient denies fatigue, fevers, chills, unintended weight loss, and night sweats.    Physical exam:  Vitals: There were no vitals taken for this visit.  GEN: This is a well developed, well-nourished male in no acute distress, in a pleasant mood.    NEURO: Alert and oriented  PSYCH: In pleasant mood, appropriate affect  SKIN: Total skin excluding the undergarment areas was performed. The exam included the head/face, neck, both arms, chest, back, abdomen, both legs, digits and/or nails.   -Skin type 1  -there are pink follicular based papules on the bilateral forearms, upper arms, and back with some pustule formation  -there is some mild wet scaling between toe webs on the bilateral feet  -there are pink acneiform papules on the forehead and the temples  -Multiple regular brown pigmented macules and papules are identified on examined surfaces.   -No other lesions of concern on areas examined.       Impression/Plan:  1. Hx of MSH2-related Ford syndrome (AKA Abigail-Edvin). Baseline check benign. Educated on the 3 skin findings that usually erupt as red bumps on the head and neck with or without scale: Sebaceous carcinoma/Sebaceous Adenoma/Keratoacathoma. Will need yearly skin check form here on. Emphasize need for sun protection given UV-related DNA damage, especially given his Type 1 skin.    Recommend yearly skin checks. Most recent: 8/9/2017    Sun precaution was advised including the use of sun screens of SPF 30 or higher, sun  protective clothing, and avoidance of tanning beds.    2. Acne vulgaris with acne scarring on face and back with possible addition of Folliculitis given his work and sweating.    Start OTC BPO wash daily. Warned of bleaching fabrics.     Start clindamycin 1% wipes qam to face.    Start tretinoin 0.025% cream qhs, start every third night, increase by one night every week as tolerated until applying every night    Anticipate possible antibiotic     Skin swab culture of a pustule on the left arm.    3. Multiple nevi    Benign nature was discussed. No further intervention required at this time.     4. Tinea pedis, bilaterally.    OTC antifungal medication x 4 weeks.    CC Julianna Alberts (Onc), Dr. DAMASO Bruno (GI), and KISHA Jensen (Genetics Counselor) on close of this encounter.  Follow-up in 3-4 months for acne. One year for skin check, earlier for new or changing lesions.       Staff Involved:  Scribe/Staff    Scribe Disclosure:   I, Miguel White, am serving as a scribe to document services personally performed by Dr. Dylan Armenta, based on data collection and the provider's statements to me.     Provider Disclosure:   I have reviewed the documentation recorded by the scribe and have edited it as needed. I have personally performed the services documented here and the documentation accurately represents those services and the decisions made by me.     Dylan Armenta MD, MS    Department of Dermatology  [facility]: Phone: [phone], Fax:[phone]  [facility]: Phone: [phone], Fax: [phone]

## 2017-08-09 NOTE — MR AVS SNAPSHOT
"              After Visit Summary   8/9/2017    Henrik Alexander    MRN: [id number]           Patient Information     Date Of Birth          1995        Visit Information        Provider Department      8/9/2017 9:30 AM Dylan Armenta MD [facility]        Today's Diagnoses     MSH2-related Mckeon syndrome (HNPCC1)    -  1    Tinea pedis        Acne vulgaris        Acne scarring        Multiple nevi        Folliculitis          Care Instructions    Abigail-Edvin syndrome is the version of mckeon syndrome that can be concerning for Sebaceous Carcinoma (Cancer), Sebaceous Adenoma (not cancer), and Kerato-acanthomas (cancer). If you get any new red bumps that is different from anything you've had, especially on the face. Please call and make a follow up. Let the nurses know you have \"Abigail Edvin Syndrome\".    ----------------------------  Clindamycin wipes: every day in the morning.  Benzoyl peroxide wash: every day on the face, chest, arms, and back. USe in the shower. Can bleach fabrics so rinse off completely.  Tretinoin (Retin-A): start every third night by applying a pea sized amount, dotting around the face. Increase by one night. Use a moisturizer if too dry.     -------------------------------    Start over-the-counter benzoyl peroxide 10% wash on the face, chest, and back.  If 10% is too irritating you can use the 5%. (Clean&Clear makes this product. It is available here at the pharmacy or at target). This medication can bleach your towels and clothing.     It is found in a purple tube in the acne aisle.               _______________________________________________________________    Topical Retinoids    What are topical retinoids?    These are medicines that are related to Vitamin A. They are used on the skin.    Retin-A , Renova , Differin , and Tazorac  are brand names.    Come in creams and clear gels    Used to treat skin conditions like pimples (acne), face wrinkling, or dark-colored " sunspots    How do I use these medicines?    Wash face and let dry for 15 to 30 minutes.    Use a large pea-size amount of medicine to cover the whole face. Do not put on close to the eyes and lips. Rub in gently.     Start by using every other day. If you have no irritation after a few days, start to use it daily.     You might have too much irritation with daily use. Use it less often until the irritation goes away. Then try to increase slowly to daily use.     Irritation improves over time.    You may use moisturizer if your skin becomes dry. Look for  non-comedogenic  (non-pore plugging) and oil free products.     What are the side effects?    Dryness     Peeling and flaking     Irritation of the skin     Possible increased chance of sunburns. Protect your skin from sunlight. Wear a hat and use a sunscreen with SPF 30 or higher. Your sunscreen should have both UVA and UVB (broad-spectrum) protection.    Who should I call with questions?    [facility]: [phone]     [facility]: [phone]    For urgent needs outside of business hours call the [facility] at [phone] and ask for the dermatology resident on call              Follow-ups after your visit        Your next 10 appointments already scheduled     Aug 11, 2017 10:30 AM CDT   Colonoscopy with Roland Bruno MD   [facility] ([facility])    [address] [place] [id number] [phone]            Dec 22, 2017  4:15 PM CST   Return Visit with Amairani Quintanilla MD   [facility]    [address] [place] [id number] [phone]            Jun 27, 2018  9:30 AM CDT   Return Visit with MARIA A Last   [facility] ([facility])    [address] [address] [place] [address] [phone]              Who to  contact     If you have questions or need follow up information about today's clinic visit or your schedule please contact [facility] directly at [phone].  Normal or non-critical lab and imaging results will be communicated to you by DonorsPlayhart, letter or phone within 4 business days after the clinic has received the results. If you do not hear from us within 7 days, please contact the clinic through DonorsPlayhart or phone. If you have a critical or abnormal lab result, we will notify you by phone as soon as possible.  Submit refill requests through ProtoExchange or call your pharmacy and they will forward the refill request to us. Please allow 3 business days for your refill to be completed.          Additional Information About Your Visit        ProtoExchange Information     ProtoExchange gives you secure access to your electronic health record. If you see a primary care provider, you can also send messages to your care team and make appointments. If you have questions, please call your primary care clinic.  If you do not have a primary care provider, please call [phone] and they will assist you.      ProtoExchange is an electronic gateway that provides easy, online access to your medical records. With ProtoExchange, you can request a clinic appointment, read your test results, renew a prescription or communicate with your care team.     To access your existing account, please contact your [facility] or call [phone] for assistance.        Care EveryWhere ID     This is your Care EveryWhere ID. This could be used by other organizations to access your [place] medical records  [id number]         Blood Pressure from Last 3 Encounters:   06/28/17 110/74    Weight from Last 3 Encounters:   06/28/17 87.9 kg (193 lb 11.2 oz) (90 %)*     * Growth percentiles are based on CDC 2-20 Years data.              We Performed the Following     Skin Culture Aerobic Bacterial          Today's Medication  Changes          These changes are accurate as of: 8/9/17  9:50 AM.  If you have any questions, ask your nurse or doctor.               Start taking these medicines.        Dose/Directions    clindamycin 1 % Swab   Commonly known as:  CLEOCIN T   Used for:  Acne vulgaris   Started by:  Dylan Armenta MD        Use 1-2 wipes on clean face daily.   Quantity:  60 each   Refills:  3       tretinoin 0.025 % cream   Commonly known as:  RETIN-A   Used for:  Acne vulgaris   Started by:  Dylan Armenta MD        Apply a pea-sized amount to the face at night. Start every 3rd night and increase by 1 night ever week as tolerated   Quantity:  20 g   Refills:  4            Where to get your medicines      These medications were sent to [facility] [address] [place] [address] AT [address]  [address] [facility] [address]     Phone:  [phone]     clindamycin 1 % Swab    tretinoin 0.025 % cream                Primary Care Provider    None Specified       No primary provider on file.        Equal Access to Services     [facility]: Hadii fidelia johnson hadasho Sooleksandr, waaxda luqadaha, qaybta kaalmada emiliana, dior vargas . So [facility] [phone].    ATENCIÓN: Si habla español, tiene a seth disposición servicios gratuitos de asistencia lingüística. Breana al [phone].    We comply with applicable federal civil rights laws and [place] laws. We do not discriminate on the basis of race, color, national origin, age, disability sex, sexual orientation or gender identity.            Thank you!     Thank you for choosing [facility]  for your care. Our goal is always to provide you with excellent care. Hearing back from our patients is one way we can continue to improve our services. Please take a few minutes to complete the written survey that you may receive in the mail after your visit with us. Thank you!             Your  Updated Medication List - Protect others around you: Learn how to safely use, store and throw away your medicines at www.disposemymeds.org.          This list is accurate as of: 8/9/17  9:50 AM.  Always use your most recent med list.                   Brand Name Dispense Instructions for use Diagnosis    clindamycin 1 % Swab    CLEOCIN T    60 each    Use 1-2 wipes on clean face daily.    Acne vulgaris       tretinoin 0.025 % cream    RETIN-A    20 g    Apply a pea-sized amount to the face at night. Start every 3rd night and increase by 1 night ever week as tolerated    Acne vulgaris

## 2017-08-09 NOTE — PATIENT INSTRUCTIONS
"Abigail-Edvin syndrome is the version of mckeon syndrome that can be concerning for Sebaceous Carcinoma (Cancer), Sebaceous Adenoma (not cancer), and Kerato-acanthomas (cancer). If you get any new red bumps that is different from anything you've had, especially on the face. Please call and make a follow up. Let the nurses know you have \"Abigail Edvin Syndrome\".    ----------------------------  Clindamycin wipes: every day in the morning.  Benzoyl peroxide wash: every day on the face, chest, arms, and back. USe in the shower. Can bleach fabrics so rinse off completely.  Tretinoin (Retin-A): start every third night by applying a pea sized amount, dotting around the face. Increase by one night. Use a moisturizer if too dry.     -------------------------------    Start over-the-counter benzoyl peroxide 10% wash on the face, chest, and back.  If 10% is too irritating you can use the 5%. (Clean&Clear makes this product. It is available here at the pharmacy or at target). This medication can bleach your towels and clothing.     It is found in a purple tube in the acne aisle.               _______________________________________________________________    Topical Retinoids    What are topical retinoids?    These are medicines that are related to Vitamin A. They are used on the skin.    Retin-A , Renova , Differin , and Tazorac  are brand names.    Come in creams and clear gels    Used to treat skin conditions like pimples (acne), face wrinkling, or dark-colored sunspots    How do I use these medicines?    Wash face and let dry for 15 to 30 minutes.    Use a large pea-size amount of medicine to cover the whole face. Do not put on close to the eyes and lips. Rub in gently.     Start by using every other day. If you have no irritation after a few days, start to use it daily.     You might have too much irritation with daily use. Use it less often until the irritation goes away. Then try to increase slowly to daily use.     Irritation " improves over time.    You may use moisturizer if your skin becomes dry. Look for  non-comedogenic  (non-pore plugging) and oil free products.     What are the side effects?    Dryness     Peeling and flaking     Irritation of the skin     Possible increased chance of sunburns. Protect your skin from sunlight. Wear a hat and use a sunscreen with SPF 30 or higher. Your sunscreen should have both UVA and UVB (broad-spectrum) protection.    Who should I call with questions?    [facility]: [phone]     [facility]: [phone]    For urgent needs outside of business hours call the [facility] at [phone] and ask for the dermatology resident on call

## 2017-08-09 NOTE — NURSING NOTE
Dermatology Rooming Note    Henrik Alexander's goals for this visit include:   Chief Complaint   Patient presents with     Derm Problem     skin check hx of MSH2- has spots on bilat forearms he wants looked at       Is a scribe okay for this visit:YES    Are records needed for this visit(If yes, obtain release of information): Not applicable     Vitals: There were no vitals taken for this visit.    Referring Provider:  MARIA A Last [address] [address]

## 2017-08-11 ENCOUNTER — DOCUMENTATION ONLY (OUTPATIENT)
Dept: GASTROENTEROLOGY | Facility: OUTPATIENT CENTER | Age: 22
End: 2017-08-11

## 2017-08-11 ENCOUNTER — TRANSFERRED RECORDS (OUTPATIENT)
Dept: HEALTH INFORMATION MANAGEMENT | Facility: CLINIC | Age: 22
End: 2017-08-11

## 2017-08-11 LAB
BACTERIA SPEC CULT: NO GROWTH
Lab: NORMAL
MICRO REPORT STATUS: NORMAL
SPECIMEN SOURCE: NORMAL

## 2017-08-11 NOTE — PROGRESS NOTES
Please call pt and let him know that the culture swab is negative for any infection. He's experiencing acne. If he'd like, we can start minocycline 50mg daily for 3-4 months until appt with Quintanilla for acne if he'd like in addition to topical meds.    Dylan Armenta MD, MS    Department of Dermatology  [facility]: Phone: [phone], Fax:[phone]  [facility]: Phone: [phone], Fax: [phone]

## 2017-08-15 ENCOUNTER — TELEPHONE (OUTPATIENT)
Dept: DERMATOLOGY | Facility: CLINIC | Age: 22
End: 2017-08-15

## 2017-08-15 DIAGNOSIS — L70.9 ACNE: Primary | ICD-10-CM

## 2017-08-15 RX ORDER — MINOCYCLINE HYDROCHLORIDE 50 MG/1
50 CAPSULE ORAL DAILY
Qty: 30 CAPSULE | Refills: 4 | Status: SHIPPED | OUTPATIENT
Start: 2017-08-15

## 2017-08-15 NOTE — TELEPHONE ENCOUNTER
Prescription sent to [facility] in [place].  Pattie Horne RN      Notes Recorded by Kiya Madera CMA on 8/15/2017 at 3:49 PM  Patient returned call and I informed him of his results.  He would   like to start the Anand.  He already has a follow up appointment with Dr. Quintanilla in December.  I informed him that I will see if we can send him enough medication to get him through to until his next appointment.  I will call him if there are any further questionn.    Please send in Minocycline 50mg daily per Dr. Armenta's note.  His next appointment is scheduled for 12.22.17.    Kiya Madera CMA    ------    Notes Recorded by Pattie Horne RN on 8/15/2017 at 10:48 AM  I left a message for patient to call [facility].  Pattie Horne RN    ------    Notes Recorded by Pattie Horne RN on 8/11/2017 at 10:51 AM  I left a message for patient to call [facility].  Pattie Horne RN    ------    Notes Recorded by Dylan Armenta MD on 8/11/2017 at 10:49 AM  Please call pt and let him know that the culture swab is negative for any infection. He's experiencing acne. If he'd like, we can start minocycline 50mg daily for 3-4 months until appt with [place] for acne if he'd like in addition to topical meds.    Dylan Armenta MD, MS    Department of Dermatology  [facility]: Phone: [phone], Fax:[phone]  [facility]: Phone: [phone], Fax: [phone]

## 2017-10-11 ENCOUNTER — OFFICE VISIT - RIVER FALLS (OUTPATIENT)
Dept: FAMILY MEDICINE | Facility: CLINIC | Age: 22
End: 2017-10-11

## 2017-10-11 ASSESSMENT — MIFFLIN-ST. JEOR: SCORE: 1859.45

## 2017-11-14 LAB — LAB SCANNED RESULT: ABNORMAL

## 2018-01-07 ENCOUNTER — HEALTH MAINTENANCE LETTER (OUTPATIENT)
Age: 23
End: 2018-01-07

## 2018-06-27 ENCOUNTER — ONCOLOGY VISIT (OUTPATIENT)
Dept: ONCOLOGY | Facility: CLINIC | Age: 23
End: 2018-06-27
Payer: COMMERCIAL

## 2018-06-27 DIAGNOSIS — Z53.9 ERRONEOUS ENCOUNTER--DISREGARD: ICD-10-CM

## 2018-06-27 DIAGNOSIS — Z15.09 MSH2-RELATED LYNCH SYNDROME (HNPCC1): Primary | ICD-10-CM

## 2018-06-27 PROCEDURE — 99207 ZZC NO CHARGE LOS: CPT | Performed by: CLINICAL NURSE SPECIALIST

## 2018-06-27 NOTE — MR AVS SNAPSHOT
After Visit Summary   6/27/2018    Henrik Alexander    MRN: [id number]           Patient Information     Date Of Birth          1995        Visit Information        Provider Department      6/27/2018 9:30 AM Julianna Alberts APRN CNS [facility]        Today's Diagnoses     MSH2-related Ford syndrome (HNPCC1)    -  1    ERRONEOUS ENCOUNTER--DISREGARD           Follow-ups after your visit        Additional Services     GASTROENTEROLOGY ADULT REF PROCEDURE ONLY [facility] [phone]       To be done with Dr. Trip Salcedo at [facility].  Last Lab Result: No results found for: CR  There is no height or weight on file to calculate BMI.     Needed:  No  Language:  English    Patient will be contacted to schedule procedure.     Please be aware that coverage of these services is subject to the terms and limitations of your health insurance plan.  Call member services at your health plan with any benefit or coverage questions.  Any procedures must be performed at a [place] facility OR coordinated by your clinic's referral office.    Please bring the following with you to your appointment:    (1) Any X-Rays, CTs or MRIs which have been performed.  Contact the facility where they were done to arrange for  prior to your scheduled appointment.    (2) List of current medications   (3) This referral request   (4) Any documents/labs given to you for this referral                  Follow-up notes from your care team     Return for Physical Exam.      Who to contact     If you have questions or need follow up information about today's clinic visit or your schedule please contact [facility] directly at [phone].  Normal or non-critical lab and imaging results will be communicated to you by MyChart, letter or phone within 4 business days after the clinic has received the results. If you do not hear from us within 7 days, please contact the clinic  through [employer] or phone. If you have a critical or abnormal lab result, we will notify you by phone as soon as possible.  Submit refill requests through [employer] or call your pharmacy and they will forward the refill request to us. Please allow 3 business days for your refill to be completed.          Additional Information About Your Visit        [employer] Information     [employer] gives you secure access to your electronic health record. If you see a primary care provider, you can also send messages to your care team and make appointments. If you have questions, please call your primary care clinic.  If you do not have a primary care provider, please call [phone] and they will assist you.      [employer] is an electronic gateway that provides easy, online access to your medical records. With [employer], you can request a clinic appointment, read your test results, renew a prescription or communicate with your care team.     To access your existing account, please contact your [facility] or call [phone] for assistance.        Care EveryWhere ID     This is your Care EveryWhere ID. This could be used by other organizations to access your [place] medical records  [id number]         Blood Pressure from Last 3 Encounters:   06/28/17 110/74    Weight from Last 3 Encounters:   06/28/17 87.9 kg (193 lb 11.2 oz) (90 %)*     * Growth percentiles are based on CDC 2-20 Years data.              We Performed the Following     GASTROENTEROLOGY ADULT REF PROCEDURE ONLY [facility] [phone]        Primary Care Provider    None Specified       No primary provider on file.        Equal Access to Services     JACK ELIAS : Hadii fidelia reyeso Sooleksandr, waaxda luqadaha, qaybta kaalmada dior hopson . So [facility] [phone].    ATENCIÓN: Si habla español, tiene a seth disposición servicios gratuitos de asistencia lingüística. Llame al [phone].    We  comply with applicable federal civil rights laws and [place] laws. We do not discriminate on the basis of race, color, national origin, age, disability, sex, sexual orientation, or gender identity.            Thank you!     Thank you for choosing [facility]  for your care. Our goal is always to provide you with excellent care. Hearing back from our patients is one way we can continue to improve our services. Please take a few minutes to complete the written survey that you may receive in the mail after your visit with us. Thank you!             Your Updated Medication List - Protect others around you: Learn how to safely use, store and throw away your medicines at www.disposemymeds.org.          This list is accurate as of 6/27/18 10:33 AM.  Always use your most recent med list.                   Brand Name Dispense Instructions for use Diagnosis    clindamycin 1 % Swab    CLEOCIN T    60 each    Use 1-2 wipes on clean face daily.    Acne vulgaris       minocycline 50 MG capsule    MINOCIN/DYNACIN    30 capsule    Take 1 capsule (50 mg) by mouth daily    Acne       tretinoin 0.025 % cream    RETIN-A    20 g    Apply a pea-sized amount to the face at night. Start every 3rd night and increase by 1 night ever week as tolerated    Acne vulgaris

## 2018-07-05 ENCOUNTER — TELEPHONE (OUTPATIENT)
Dept: ONCOLOGY | Facility: CLINIC | Age: 23
End: 2018-07-05

## 2018-07-05 NOTE — TELEPHONE ENCOUNTER
Mail box was full. Was trying to get appointment with Julianna Pino re-scheduled. He no-showed on 6/27    Bre Scott

## 2018-12-11 ENCOUNTER — TELEPHONE (OUTPATIENT)
Dept: ONCOLOGY | Facility: CLINIC | Age: 23
End: 2018-12-11

## 2020-03-10 ENCOUNTER — HEALTH MAINTENANCE LETTER (OUTPATIENT)
Age: 25
End: 2020-03-10

## 2020-12-22 ENCOUNTER — VIRTUAL VISIT (OUTPATIENT)
Dept: ONCOLOGY | Facility: CLINIC | Age: 25
End: 2020-12-22
Attending: CLINICAL NURSE SPECIALIST
Payer: COMMERCIAL

## 2020-12-22 DIAGNOSIS — Z15.09 MSH2-RELATED LYNCH SYNDROME (HNPCC1): Primary | ICD-10-CM

## 2020-12-22 PROBLEM — Z80.0 FAMILY HISTORY OF COLON CANCER: Status: ACTIVE | Noted: 2020-12-22

## 2020-12-22 PROBLEM — F41.1 GAD (GENERALIZED ANXIETY DISORDER): Status: ACTIVE | Noted: 2020-12-22

## 2020-12-22 PROCEDURE — 99212 OFFICE O/P EST SF 10 MIN: CPT | Mod: 95 | Performed by: CLINICAL NURSE SPECIALIST

## 2020-12-22 NOTE — PROGRESS NOTES
"  Henrik Alexander is a 24 year old male who is being evaluated via a billable video visit.      The patient has been notified of following:     \"This video visit will be conducted via a call between you and your physician/provider. We have found that certain health care needs can be provided without the need for an in-person physical exam.  This service lets us provide the care you need with a video conversation.  If a prescription is necessary we can send it directly to your pharmacy.  If lab work is needed we can place an order for that and you can then stop by our lab to have the test done at a later time.    Video visits are billed at different rates depending on your insurance coverage.  Please reach out to your insurance provider with any questions.    If during the course of the call the physician/provider feels a video visit is not appropriate, you will not be charged for this service.\"    Patient has given verbal consent for Video visit? Yes  How would you like to obtain your AVS? MyChart  If you are dropped from the video visit, the video invite should be resent to: Send to e-mail at: [email or web address]  Will anyone else be joining your video visit? No      Vitals - Patient Reported  Weight (Patient Reported): 108.9 kg (240 lb)  Height (Patient Reported): 177.8 cm (5' 10\")  BMI (Based on Pt Reported Ht/Wt): 34.44  Pain Score: No Pain (0)      I have reviewed and updated patient's allergy and medication list.    Concerns: NONE  Refills: NONE      Mayte Carey CMA    Video-Visit Details    Type of service:  Video Visit    Video Start Time: 1429  Video End Time: 1440    Originating Location (pt. Location): Home    Distant Location (provider location):  [facility]     Platform used for Video Visit: SimpleOrder    Oncology Risk Management Consultation:  Date on this visit: 12/22/2020    Henrik Alexander is participating in a billable video visit today for annual oncology risk " management screening and surveillance. He requires screening and surveillance to reduce his risk of cancer secondary to MSH2+ associated Ford Syndrome.     He has a family history of colon cancer in his mother at age 45 as well as other Ford-related cancers.    Primary Physician: No primary care provider on file.     History Of Present Illness:  Mr. Alexander is a very pleasant,  24 year old male who presents with family history of Ford Syndrome and a personal diagnosis of an MSH2+ mutation.    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  Specifically, the genetic mutation is c.2131C>T (p.Uuy087*), which is the same genetic mutation as his mother and siblings.     Pertinent History:  8/11/2017- Colonoscopy (Dr. Bruno, [facility]), normal  11/4/2019- Colonoscopy, (Dr. Matthieu Mccracken, [facility]), one hyperplastic polyp removed from sigmoid colon    Review of Systems:  GENERAL: No change in weight, sleep or appetite.  Normal energy.  No fever or chills  EYES: Negative for vision changes or eye problems  ENT: No problems with ears, nose or throat.  No difficulty swallowing.  RESP: No coughing, wheezing or shortness of breath  CV: No chest pains or palpitations  GI: No nausea, vomiting,  heartburn, abdominal pain, diarrhea, constipation or change in bowel habits  : No urinary frequency or dysuria, bladder or kidney problems  MUSCULOSKELETAL: No significant muscle or joint pains  NEUROLOGIC: No headaches, numbness, tingling, weakness, problems with balance or coordination  PSYCHIATRIC: No problems with anxiety, depression or mental health  HEME/IMMUNE/ALLERGY: No history of bleeding or clotting problems or anemia.  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 on 3/1/2017 Mutation is c.2131C>T (p.Aww464*)     No past surgical history on file.    Allergies:  Allergies as of 12/22/2020     (Not on File)       Current Medications:  Current Outpatient Medications   Medication Sig Dispense Refill     clindamycin (CLEOCIN T) 1 % SWAB Use 1-2 wipes on clean face daily. 60 each 3     minocycline (MINOCIN/DYNACIN) 50 MG capsule Take 1 capsule (50 mg) by mouth daily 30 capsule 4     tretinoin (RETIN-A) 0.025 % cream Apply a pea-sized amount to the face at night. Start every 3rd night and increase by 1 night ever week as tolerated 20 g 4        Family History:  Family History   Problem Relation Age of Onset     Colon Cancer Mother 25     Genetic Disorder Mother         MSH2+ (Ford syndrome)     Endometrial Cancer Mother 44     Genetic Disorder Sister         MSH2+     Genetic Disorder Brother         MSH2+     Pancreatic Cancer Maternal Grandfather 70     Colon Cancer Maternal Grandfather 40     Pancreatic Cancer Maternal Aunt 59        hx of smoking     Colon Cancer Maternal Aunt 51        mets to bladder       Social History:  Social History     Socioeconomic History     Marital status: Single     Spouse name: Not on file     Number of children: Not on file     Years of education: Not on file     Highest education level: Not on file   Occupational History     Employer: UNKNOWN   Social Needs     Financial resource strain: Not on file     Food insecurity     Worry: Not on file     Inability: Not on file     Transportation needs     Medical: Not on file     Non-medical: Not on file   Tobacco Use     Smoking status: Never Smoker     Smokeless tobacco: Never Used   Substance and Sexual Activity     Alcohol use: No     Drug use: No     Sexual activity: Not on file   Lifestyle     Physical activity     Days per week: Not on file     Minutes per session: Not on file     Stress:  Not on file   Relationships     Social connections     Talks on phone: Not on file     Gets together: Not on file     Attends Taoist service: Not on file     Active member of club or organization: Not on file     Attends meetings of clubs or organizations: Not on file     Relationship status: Not on file     Intimate partner violence     Fear of current or ex partner: Not on file     Emotionally abused: Not on file     Physically abused: Not on file     Forced sexual activity: Not on file   Other Topics Concern     Not on file   Social History Narrative     Not on file       Physical Exam:  There were no vitals taken for this visit.  GENERAL: Healthy, alert and no distress  EYES: Eyes grossly normal to inspection.  No discharge or erythema, or obvious scleral/conjunctival abnormalities.  HENT: normal cephalic/atraumatic, ear canals and TM's normal, nose and mouth without ulcers or lesions, oropharynx clear and oral mucous membranes moist  RESP: No audible wheeze, cough, or visible cyanosis.  No visible retractions or increased work of breathing.    SKIN: Visible skin clear. No significant rash, abnormal pigmentation or lesions.  NEURO: Cranial nerves grossly intact.  Mentation and speech appropriate for age.  PSYCH: Mentation appears normal, affect normal/bright, judgement and insight intact, normal speech and appearance well-groomed.      Laboratory/Imaging Studies  No results found for any visits on 12/22/20.    ASSESSMENT  We discussed the differences between cancer risk for the general population and those with MSH2+ mutations, according to the NCCN Guidelines and new literature.  We also discussed that inheriting a mutation does not mean that a person will develop cancer, but rather that they are at increased risk.   The following is the list of cancer risks for MSH2+ carriers,  per the National Comprehensive Cancer Network guidelines V. 1.2020  Site  Estimated Average Age of Presentation for MSH2+ carriers  Cumulative Risk for Diagnosis Through Age 80 Cumulative Risk for Diagnosis Through Lifetime for people in the general population     Colorectal    44 years   33-52%    4.2%   Endometrial 47-48 years   21-57%    3.1%   Ovarian    43 years 8.0-30%    1.3%     Renal pelvis and/or ureter    54-61 years   2.2-28%   Less than 1%     Bladder    59 years 4.4-12.8% 2.4%   Gastric  52 years 0.2-9.0% 0.9%   Small bowel 48 years   1.1-10%    0.3%     Pancreas    No data   0.5-1.6%   1.6%     Biliary tract    57 years   0.02-1.7%    0.2%     Prostate    59-63 years   3.9-15.9%    11.6%   Breast (female)    No data 1.5-12.8%    12.8%     Brain    No data   2.5-7.7%    0.6%       INDIVIDUALIZED CANCER RISK MANAGEMENT PLAN:  Individualized Surveillance Plan for Ford Syndrome   (MLH1+, MSH2+ and EPCAM+ mutation carriers)   Based on NCCN Guidelines Version 1.2020   Type of Screening Recommendation Last Done Next Due   Colon Cancer Screening Colonoscopy at age 20-25 years or 2-5 years prior to the earliest colon cancer in the family if it is diagnosed prior to age 25.     Repeat every 1-2 years.  11/4/2019- Colonoscopy, normal Ordered to be done soon   Endometrial and Ovarian Cancer Prophylactic hysterectomy and bilateral salpingo-oophorectomy (BSO) can be considered by women who have completed childbearing.    Insufficient evidence exists to make specific recommendation for RRSO in MSH6+ and PMS2+ carriers.     NA     NA    Screening using  endometrial sampling is an option every 1-2 years; women should be aware that dysfunctional uterine bleeding warrants evaluation.    Data do not support ovarian cancer screening for Ford Syndrome. Annual transvaginal ultrasound and  tests may be considered at a clinician s discretion.       Pancreatic Screening, start at age 50 or 10 years prior to pancreatic cancer in the family for carriers with family history of pancreatic cancer   Annual contrast-enhanced MRI/MRCP and/or EUS, with  consideration of shorter screening intervals for individuals found to have worrisome abnormalities on screening.   Most small cystic lesions found on screening will not warrant biopsy, surgical resection, or any other intervention.   Family history of pancreatic cancer in maternal aunt and maternal grandfather   Begin at age 50   Gastric and small bowel cancer Selected individuals or families or those of  descent may consider EGD with extended duodenoscopy (to distal duodenum or into the jejunum) every 3-5 years beginning at age 40.   No family history of upper GI cancers   Review family history and symptoms at each visit   Urothelial cancer Consider annual urinalysis at age 30-35. MSH2+ carriers, especially males are most at risk.   NA Begin at age 30   Dermatology Routine dermatological screening. 8/11/2017- Met with Dr. Dylan Armenta Referred back to Dermatology as needed   Prostate Screening  Annual PSA test, refer to Urology if elevated; MSH2+ (30-32% lifetime risk) and MLH1+ (up to 17%). MSH6+ (up to 5%). PMS2+ (not well established)   NA Begin at age 40   Central Nervous System cancer Annual physical/neurological examinations starting at age 25-30. 12/22/2020 December 2021   There is an increased incidence of prostate cancer; no additional screening recommendations are made at this time.    There are data to suggest that aspirin may decrease the risk of colon cancer in Ford Syndrome.  However, optimal dose and duration of aspirin therapy is uncertain/    There have been suggestions that there is an increased risk for breast cancer in Ford Syndrome patients; however, there is not enough evidence to support increased screening above average risk breast cancer screening.       I look forward to working with him to manage his cancer risk and will monitor his  screenings and follow up with him in one year.    I spent 11 minutes with the patient with greater that 50% of it in counseling and coordinating care as  documented above.    MARIA A Last-CNS, OCN, AGN-BC  Clinical Nurse Specialist  Cancer Risk Management Program  [facility] [address]  [place] [address]    phone:  [phone]  Pager: [phone]  fax: [phone]

## 2020-12-24 ENCOUNTER — HOSPITAL ENCOUNTER (OUTPATIENT)
Facility: AMBULATORY SURGERY CENTER | Age: 25
End: 2020-12-24
Attending: INTERNAL MEDICINE

## 2020-12-27 ENCOUNTER — HEALTH MAINTENANCE LETTER (OUTPATIENT)
Age: 25
End: 2020-12-27

## 2021-01-03 DIAGNOSIS — Z11.59 ENCOUNTER FOR SCREENING FOR OTHER VIRAL DISEASES: Primary | ICD-10-CM

## 2021-04-24 ENCOUNTER — HEALTH MAINTENANCE LETTER (OUTPATIENT)
Age: 26
End: 2021-04-24

## 2021-10-09 ENCOUNTER — HEALTH MAINTENANCE LETTER (OUTPATIENT)
Age: 26
End: 2021-10-09

## 2022-02-12 VITALS
DIASTOLIC BLOOD PRESSURE: 70 MMHG | TEMPERATURE: 97.6 F | HEIGHT: 69 IN | OXYGEN SATURATION: 97 % | HEART RATE: 92 BPM | BODY MASS INDEX: 28.71 KG/M2 | SYSTOLIC BLOOD PRESSURE: 128 MMHG | WEIGHT: 193.8 LBS

## 2022-02-16 NOTE — PROGRESS NOTES
Chief Complaint    Patient presents with a right ankle injury. Broom ball stick to the ankle and also slid on the ice yesterday.  History of Present Illness      Patient presents to clinic today with report of acute right ankle pain.  He was playing kickball earlier today and was hit on his right lateral ankle.  Is now painful for him to be able to bear weight.  He is presented to clinic to find out if his ankle has been fractured.       Patient denies any loss of sensation.  No numbness.  No bleeding.  Tetanus shot is up-to-date.       Review of systems as per HPI and is otherwise negative.       Past medical history is positive for Ford syndrome.  He is up-to-date on his colonoscopies.       Past surgical history is negative       Social history patient is a student at [employer]       Family history positive for Ford syndrome       Allergies no known drug allergies.  Current medications none.       Exam general patient is alert and oriented ×3 in no acute distress.  HEENT normocephalic atraumatic.  Pupils are equally round and reactive to light.  Extraocular motion is intact.  Hearing is grossly normal.  Mucous membranes are moist and pink.  Chest has bilateral rise with no increased work of breathing.  Cardiovascular normal perfusion and brisk capillary refill.  Right foot has normal dorsal pedis and anterior tibialis pulses.  Neuro she has nerves II through XII are grossly intact.  Right foot sensation and motor control are normal.  Musculoskeletal patient has tenderness at his right lateral malleolus at the tip of his distal fibula.  There is also some significant edema at the lateral malleolus there is no breaks in the skin.  There is ecchymosis developing.       Radiology ankle imaging does show a distal fibula fracture.       Assessment and plan right distal fibula small fracture.  Ankle mortise is normal.  Patient was given crutches and an Aircast.  Patient felt like this provided significant  support.  Encouraged him to return to clinic in 2 weeks for repeat imaging and follow-up exam.  Counseled patient to bear weight as tolerated and to keep the affected limb elevated when possible and to use Tylenol and ibuprofen as needed for pain control.  He should certainly return to clinic if his symptoms seem to worsen.  Also discussed with patient warning signs of compression syndrome and to go to the emergency room if the pain in his leg significantly worsens or he seems to become pale and his leg as this may represent lack of blood flow to part of his labrum which could result in permanent injury if he does not see medical care immediately.  Physical Exam   Vitals & Measurements    T: 97.6(Tympanic)  HR: 92(Peripheral)  BP: 128/70  SpO2: 97%     HT: 69 in  WT: 193.8 lb  BMI: 28.62   Assessment/Plan       Immunization due         Ordered:          influenza virus vaccine, inactivated, 0.5 mL, im, once          13130 imadm prq id subq/im njxs 1 vaccine (Charge), Quantity: 1, Immunization due          78296 influenza virus vacc split prsrv free 3 yrs/> im (Charge), Quantity: 1, Immunization due                Right ankle pain         Ordered:           ankle control orthosi prefab (Charge), Quantity: 1, Right ankle pain          Return to Clinic (Request), RFV: pr needs reimaging of his right ankle followed by appointment with me to review results, Return in 2 weeks          XR Ankle Complete Right (Request), Right ankle pain           Patient Information     Name:JAJA HERRON      Address:      [address] [place] [id number]     Sex:Male     YOB: 1995     [phone]     MRN:[id number]     FIN:[id number]     Location:[facility]     Date of Service:10/11/2017      Primary Care Physician:       NONE ,   Problem List/Past Medical History    Ongoing     Seasonal allergies    Historical  Procedure/Surgical History     Lymph node (2013)     Leg  (2003)  Medications   No active medications  Allergies    amoxicillin (Fever, Hives)  Social History    Smoking Status - 10/11/2017     Never smoker     Alcohol - 10/12/2017      Current, 1-2 times per week, 10 drinks/episode average. 12 drinks/episode maximum.     Employment and Education - 10/12/2017      Student     Exercise and Physical Activity - 10/12/2017      Exercise frequency: Never.     Home and Environment - 10/12/2017      Marital status: Single. Risks in environment: Does not wear helmet.     Nutrition and Health - 10/12/2017      Type of diet: Regular.     Sexual - 10/12/2017      Sexually active: No. Sexual orientation: Straight or heterosexual.     Substance Abuse - 10/12/2017      Never     Tobacco - 10/12/2017      Never (less than 100 in lifetime)  Family History    Cancer: Mother.  Immunizations      Vaccine Date Status      influenza virus vaccine, inactivated 10/11/2017 Given      influenza virus vaccine, inactivated 10/29/2014 Recorded      influenza virus vaccine, inactivated 01/11/2013 Recorded      influenza (LAIV) 11/08/2010 Recorded      meningococcal conjugate vaccine 08/19/2010 Recorded      tetanus/diphth/pertuss (Tdap) adult/adol 08/19/2010 Recorded      influenza, H1N1, inactivated 12/08/2009 Recorded      MMR (measles/mumps/rubella) 07/07/2003 Recorded      IPV 07/07/2003 Recorded      DTaP 02/11/2000 Recorded      DTaP 01/18/1999 Recorded      Hib 01/18/1999 Recorded      hepatitis B pediatric vaccine 01/18/1999 Recorded      MMR (measles/mumps/rubella) 01/18/1999 Recorded      IPV 01/18/1999 Recorded      DTaP 06/29/1998 Recorded      DTaP 04/16/1998 Recorded      Hib 04/16/1998 Recorded      hepatitis B pediatric vaccine 04/16/1998 Recorded      IPV 04/16/1998 Recorded      DTaP 02/26/1998 Recorded      Hib 02/26/1998 Recorded      hepatitis B pediatric vaccine 02/26/1998 Recorded      IPV 02/26/1998 Recorded  Lab Results      Results (Last 90 days)      No results located.

## 2022-05-16 ENCOUNTER — HEALTH MAINTENANCE LETTER (OUTPATIENT)
Age: 27
End: 2022-05-16

## 2022-09-11 ENCOUNTER — HEALTH MAINTENANCE LETTER (OUTPATIENT)
Age: 27
End: 2022-09-11

## 2023-06-03 ENCOUNTER — HEALTH MAINTENANCE LETTER (OUTPATIENT)
Age: 28
End: 2023-06-03